# Patient Record
Sex: MALE | Race: WHITE | NOT HISPANIC OR LATINO | Employment: FULL TIME | URBAN - METROPOLITAN AREA
[De-identification: names, ages, dates, MRNs, and addresses within clinical notes are randomized per-mention and may not be internally consistent; named-entity substitution may affect disease eponyms.]

---

## 2017-01-04 RX ORDER — TADALAFIL 5 MG/1
TABLET, FILM COATED ORAL
Qty: 30 TABLET | Refills: 3 | Status: SHIPPED | OUTPATIENT
Start: 2017-01-04 | End: 2017-03-17 | Stop reason: SDUPTHER

## 2017-01-07 ENCOUNTER — LAB VISIT (OUTPATIENT)
Dept: LAB | Facility: HOSPITAL | Age: 42
End: 2017-01-07
Attending: NURSE PRACTITIONER
Payer: COMMERCIAL

## 2017-01-07 DIAGNOSIS — E10.9 TYPE 1 DIABETES MELLITUS WITHOUT COMPLICATION: ICD-10-CM

## 2017-01-07 LAB
ALBUMIN SERPL BCP-MCNC: 4.1 G/DL
ALP SERPL-CCNC: 59 U/L
ALT SERPL W/O P-5'-P-CCNC: 10 U/L
ANION GAP SERPL CALC-SCNC: 7 MMOL/L
AST SERPL-CCNC: 13 U/L
BILIRUB SERPL-MCNC: 1 MG/DL
BUN SERPL-MCNC: 14 MG/DL
CALCIUM SERPL-MCNC: 9.3 MG/DL
CHLORIDE SERPL-SCNC: 102 MMOL/L
CHOLEST/HDLC SERPL: 3.1 {RATIO}
CO2 SERPL-SCNC: 29 MMOL/L
CREAT SERPL-MCNC: 1 MG/DL
EST. GFR  (AFRICAN AMERICAN): >60 ML/MIN/1.73 M^2
EST. GFR  (NON AFRICAN AMERICAN): >60 ML/MIN/1.73 M^2
GLUCOSE SERPL-MCNC: 237 MG/DL
HDL/CHOLESTEROL RATIO: 32.2 %
HDLC SERPL-MCNC: 171 MG/DL
HDLC SERPL-MCNC: 55 MG/DL
LDLC SERPL CALC-MCNC: 102.8 MG/DL
NONHDLC SERPL-MCNC: 116 MG/DL
POTASSIUM SERPL-SCNC: 4.3 MMOL/L
PROT SERPL-MCNC: 7.1 G/DL
SODIUM SERPL-SCNC: 138 MMOL/L
TRIGL SERPL-MCNC: 66 MG/DL

## 2017-01-07 PROCEDURE — 36415 COLL VENOUS BLD VENIPUNCTURE: CPT | Mod: PO

## 2017-01-07 PROCEDURE — 80053 COMPREHEN METABOLIC PANEL: CPT

## 2017-01-07 PROCEDURE — 80061 LIPID PANEL: CPT

## 2017-01-07 PROCEDURE — 83036 HEMOGLOBIN GLYCOSYLATED A1C: CPT

## 2017-01-09 LAB
ESTIMATED AVG GLUCOSE: 146 MG/DL
HBA1C MFR BLD HPLC: 6.7 %

## 2017-01-13 ENCOUNTER — OFFICE VISIT (OUTPATIENT)
Dept: ENDOCRINOLOGY | Facility: CLINIC | Age: 42
End: 2017-01-13
Payer: COMMERCIAL

## 2017-01-13 VITALS
SYSTOLIC BLOOD PRESSURE: 122 MMHG | BODY MASS INDEX: 25.35 KG/M2 | WEIGHT: 187.19 LBS | HEIGHT: 72 IN | HEART RATE: 81 BPM | DIASTOLIC BLOOD PRESSURE: 74 MMHG

## 2017-01-13 DIAGNOSIS — N52.9 ED (ERECTILE DYSFUNCTION) OF ORGANIC ORIGIN: ICD-10-CM

## 2017-01-13 DIAGNOSIS — Z79.4 INSULIN LONG-TERM USE: ICD-10-CM

## 2017-01-13 DIAGNOSIS — E10.9 TYPE 1 DIABETES MELLITUS WITHOUT COMPLICATION: Primary | ICD-10-CM

## 2017-01-13 PROCEDURE — 4010F ACE/ARB THERAPY RXD/TAKEN: CPT | Mod: S$GLB,,, | Performed by: NURSE PRACTITIONER

## 2017-01-13 PROCEDURE — 99999 PR PBB SHADOW E&M-EST. PATIENT-LVL III: CPT | Mod: PBBFAC,,, | Performed by: NURSE PRACTITIONER

## 2017-01-13 PROCEDURE — 99213 OFFICE O/P EST LOW 20 MIN: CPT | Mod: S$GLB,,, | Performed by: NURSE PRACTITIONER

## 2017-01-13 PROCEDURE — 1159F MED LIST DOCD IN RCRD: CPT | Mod: S$GLB,,, | Performed by: NURSE PRACTITIONER

## 2017-01-13 PROCEDURE — 3044F HG A1C LEVEL LT 7.0%: CPT | Mod: S$GLB,,, | Performed by: NURSE PRACTITIONER

## 2017-01-13 PROCEDURE — 2022F DILAT RTA XM EVC RTNOPTHY: CPT | Mod: S$GLB,,, | Performed by: NURSE PRACTITIONER

## 2017-01-13 RX ORDER — ESCITALOPRAM OXALATE 20 MG/1
1 TABLET ORAL
COMMUNITY
Start: 2016-01-29 | End: 2017-01-13

## 2017-01-13 RX ORDER — GABAPENTIN 600 MG/1
TABLET ORAL
COMMUNITY
Start: 2017-01-07 | End: 2017-01-13

## 2017-01-13 RX ORDER — LEVETIRACETAM 750 MG/1
750 TABLET ORAL
COMMUNITY
Start: 2016-02-22 | End: 2019-07-23 | Stop reason: SDUPTHER

## 2017-01-13 RX ORDER — TADALAFIL 5 MG/1
TABLET ORAL
COMMUNITY
Start: 2016-02-02 | End: 2018-02-14 | Stop reason: SDUPTHER

## 2017-01-13 RX ORDER — INSULIN LISPRO 100 [IU]/ML
5 INJECTION, SOLUTION INTRAVENOUS; SUBCUTANEOUS
COMMUNITY
End: 2017-01-13 | Stop reason: SDUPTHER

## 2017-01-13 RX ORDER — LISINOPRIL 5 MG/1
5 TABLET ORAL DAILY
Refills: 11 | COMMUNITY
Start: 2016-11-24 | End: 2017-01-13 | Stop reason: DRUGHIGH

## 2017-01-13 RX ORDER — INSULIN GLARGINE 100 [IU]/ML
25 INJECTION, SOLUTION SUBCUTANEOUS
COMMUNITY
End: 2017-01-13 | Stop reason: SDUPTHER

## 2017-01-13 NOTE — PROGRESS NOTES
Subjective:      Patient ID: Rajesh Donovan is a 41 y.o. male.    Chief Complaint:  Routine DM f/u     History of Present Illness    Pt is a very pleasant,  40 y/o wm with a dx of reportedly confirmed Type 1 DM since  2013. . Pt is now doing carb counting--he is a --and doing well. No logs.   Checks QID. Glucoses near perfect all the time, generally low 100.    No acute events.      Checking glucoses 5 x day  FBS    Lunches--  Supper and bedtime similar but holidays variable.     Diabetes Flow Sheet:  Diabetes Medications:  Lantus 15, Humalog  1:15  Diabetes Complications:--testicular neuropathy --started with lantus/levemir   Aspirin:   Statin: none  ACE/ARB:none   Last Urine Microalbumin:   Last Eye exam:  Last Diabetic Education:            Review of Systems   Constitutional: Negative for activity change, appetite change, fatigue and unexpected weight change.   HENT: Negative for hearing loss and trouble swallowing.    Eyes: Negative for photophobia and visual disturbance.   Respiratory: Negative for cough and shortness of breath.    Cardiovascular: Negative for chest pain, palpitations and leg swelling.   Gastrointestinal: Negative for constipation and diarrhea.   Genitourinary: Negative for difficulty urinating and frequency.             Musculoskeletal: Negative for arthralgias and joint swelling.   Skin: Negative for rash and wound.   Neurological: Positive for numbness (groin region ). Negative for weakness.   Psychiatric/Behavioral: Negative for agitation, decreased concentration and sleep disturbance. The patient is not nervous/anxious.         Family strife/separation continues        Objective:   Physical Exam   Constitutional: He is oriented to person, place, and time. He appears well-developed and well-nourished. No distress.   HENT:   Head: Normocephalic and atraumatic.   Nose: Nose normal.   Mouth/Throat: Oropharynx is clear and moist.   Eyes: Conjunctivae and EOM  are normal. Pupils are equal, round, and reactive to light.   Neck: Normal range of motion. Neck supple. No tracheal deviation present. No thyromegaly present.   Cardiovascular: Normal rate, regular rhythm, normal heart sounds and intact distal pulses.  Exam reveals no friction rub.    No murmur heard.  Pulmonary/Chest: Effort normal and breath sounds normal. No respiratory distress. He has no wheezes.   Musculoskeletal: Normal range of motion. He exhibits no edema.   Feet: no open wounds or calluses.  Good pedal care    Protective Sensation (w/ 10 gram monofilament):  Right: Intact  Left: Intact    Visual Inspection:  Normal -  Bilateral    Pedal Pulses:   Right: Present  Left: Present    Posterior tibialis:   Right:Present  Left: Present     Lymphadenopathy:     He has no cervical adenopathy.   Neurological: He is alert and oriented to person, place, and time. He has normal reflexes. No cranial nerve deficit. He exhibits normal muscle tone. Coordination normal.   Skin: Skin is warm and dry. No rash noted. He is not diaphoretic. No erythema.   Psychiatric: He has a normal mood and affect. His behavior is normal. Judgment and thought content normal.       Lab Review:   Hemoglobin A1C   Date Value Ref Range Status   01/07/2017 6.7 (H) 4.5 - 6.2 % Final     Comment:     According to ADA guidelines, hemoglobin A1C <7.0% represents  optimal control in non-pregnant diabetic patients.  Different  metrics may apply to specific populations.   Standards of Medical Care in Diabetes - 2016.  For the purpose of screening for the presence of diabetes:  <5.7%     Consistent with the absence of diabetes  5.7-6.4%  Consistent with increasing risk for diabetes   (prediabetes)  >or=6.5%  Consistent with diabetes  Currently no consensus exists for use of hemoglobin A1C  for diagnosis of diabetes for children.     04/30/2016 6.5 (H) 4.5 - 6.2 % Final   07/23/2015 7.4 (H) 4.5 - 6.2 % Final       Chemistry        Component Value  Date/Time     01/07/2017 0859    K 4.3 01/07/2017 0859     01/07/2017 0859    CO2 29 01/07/2017 0859    BUN 14 01/07/2017 0859    CREATININE 1.0 01/07/2017 0859     (H) 01/07/2017 0859        Component Value Date/Time    CALCIUM 9.3 01/07/2017 0859    ALKPHOS 59 01/07/2017 0859    AST 13 01/07/2017 0859    ALT 10 01/07/2017 0859    BILITOT 1.0 01/07/2017 0859        Lab Results   Component Value Date    LDLCALC 102.8 01/07/2017     Lab Results   Component Value Date    TSH 1.426 04/12/2014           Assessment:     1. Type 1 diabetes mellitus without complication  -chronic-stable-no changes    2. ED (erectile dysfunction) of organic origin  -good results with cialis    3. Insulin long-term use         Plan:     1. Type 1 DM --continue  lantus to 12 units,  Continue  Humalog  Carb ratio.   1:15  (or 2-5 units with meals      ORDERS  1/13/17  6 mo with m3k--yr to call.

## 2017-02-01 DIAGNOSIS — E10.9 TYPE 1 DIABETES MELLITUS WITHOUT COMPLICATION: ICD-10-CM

## 2017-02-01 RX ORDER — INSULIN LISPRO 100 [IU]/ML
INJECTION, SOLUTION INTRAVENOUS; SUBCUTANEOUS
Qty: 15 SYRINGE | Refills: 3 | Status: SHIPPED | OUTPATIENT
Start: 2017-02-01 | End: 2018-05-04 | Stop reason: SDUPTHER

## 2017-03-20 RX ORDER — TADALAFIL 5 MG/1
TABLET, FILM COATED ORAL
Qty: 30 TABLET | Refills: 3 | Status: SHIPPED | OUTPATIENT
Start: 2017-03-20 | End: 2019-07-23 | Stop reason: ALTCHOICE

## 2017-04-12 ENCOUNTER — PATIENT MESSAGE (OUTPATIENT)
Dept: ENDOCRINOLOGY | Facility: CLINIC | Age: 42
End: 2017-04-12

## 2017-04-13 DIAGNOSIS — E10.9 TYPE 1 DIABETES MELLITUS WITHOUT COMPLICATION: Primary | ICD-10-CM

## 2017-04-17 ENCOUNTER — TELEPHONE (OUTPATIENT)
Dept: ENDOCRINOLOGY | Facility: CLINIC | Age: 42
End: 2017-04-17

## 2017-04-17 NOTE — TELEPHONE ENCOUNTER
Contacted patient to schedule appointments for continuous glucose monitor as per Kelley Mena.  Unable to reach patient but left message on his machine for him to call us back.

## 2017-04-24 ENCOUNTER — CLINICAL SUPPORT (OUTPATIENT)
Dept: DIABETES | Facility: CLINIC | Age: 42
End: 2017-04-24
Payer: COMMERCIAL

## 2017-04-24 DIAGNOSIS — E10.8 TYPE 1 DIABETES MELLITUS WITH COMPLICATION, WITH LONG-TERM CURRENT USE OF INSULIN: Primary | ICD-10-CM

## 2017-04-24 NOTE — PROGRESS NOTES
"DIABETES EDUCATOR NOTE   PLACEMENT OF FREESTYLE TRISHA PRO SENSOR  CONTINOUS GLUCOSE MONITORING SYSTEM (CGMS)    Patient is here in clinic today for placement of continuous glucose monitoring sensor.      Patient verified that they were here for CGMS procedure ordered by their provider and that they have a working glucose meter and supplies at home.   Patient provided with a Freestyle Trisha Sensor and a copy of the Continuous Glucose Monitoring Patient Log to fill out during the study.   A detailed explanation of Continuous Glucose Monitoring was provided. Patient informed that this is a blind procedure and that they will not actually see the blood sugar tracing in real time.  Reviewed with patient the  patient education handout called "Your Freestyle Trisha Pro sensor: What you need to know" to review self-care during the study to avoid sensor loosening or removal ie... bathing, swimming, dressing, and exercising.   Instructed patient to check blood sugar using home glucometer and to record the following on provided patient log sheets: Blood sugar taken at home, Meals and snacks, Activity, and Diabetes medications taken and dosage    Patient was brought to a private location.  Arm for insertion was selected and prepared and allowed to dry. Glucose Sensor Serial Number [id number] was inserted to back of patient's right upper arm.    The following forms were given and reviewed in detail with patient and all questions answered.   · Continuous Glucose Monitoring Patient Log #[id number]  · Freestyle Manufacture Patient Handout "Your Freestyle Trisha Pro Sensor: What you need to know"     Instructions: Time: 15 min   Insertion of sensor done individually in private:  Time: 5 minutes         "

## 2017-05-01 ENCOUNTER — CLINICAL SUPPORT (OUTPATIENT)
Dept: DIABETES | Facility: CLINIC | Age: 42
End: 2017-05-01
Payer: COMMERCIAL

## 2017-05-01 DIAGNOSIS — E10.9 TYPE 1 DIABETES MELLITUS WITHOUT COMPLICATION: ICD-10-CM

## 2017-05-01 PROCEDURE — 95250 CONT GLUC MNTR PHYS/QHP EQP: CPT | Mod: S$GLB,,, | Performed by: DIETITIAN, REGISTERED

## 2017-05-01 NOTE — PROGRESS NOTES
DIABETES EDUCATOR NOTE   Return of the Freestyle Nguyen Pro Sensor and Patient Log.    Patient returned to clinic today to return Glucose Sensor and signed patient log form used in CGMS procedure.    The CGMS Sensor will be scanned and downloaded. All reports will be imported into the patient's electronic medical record.    Endocrine provider will complete data interpretation on 5/9 and make recommendations; will forward recommendations to the ordering provider for follow up with patient.

## 2017-05-09 ENCOUNTER — OFFICE VISIT (OUTPATIENT)
Dept: ENDOCRINOLOGY | Facility: CLINIC | Age: 42
End: 2017-05-09
Payer: COMMERCIAL

## 2017-05-09 VITALS
HEART RATE: 82 BPM | WEIGHT: 178 LBS | BODY MASS INDEX: 24.11 KG/M2 | SYSTOLIC BLOOD PRESSURE: 108 MMHG | DIASTOLIC BLOOD PRESSURE: 72 MMHG | HEIGHT: 72 IN

## 2017-05-09 DIAGNOSIS — E10.9 TYPE 1 DIABETES MELLITUS WITHOUT COMPLICATION: Primary | ICD-10-CM

## 2017-05-09 DIAGNOSIS — N52.9 ED (ERECTILE DYSFUNCTION) OF ORGANIC ORIGIN: ICD-10-CM

## 2017-05-09 DIAGNOSIS — Z79.4 INSULIN LONG-TERM USE: ICD-10-CM

## 2017-05-09 PROCEDURE — 99999 PR PBB SHADOW E&M-EST. PATIENT-LVL III: CPT | Mod: PBBFAC,,, | Performed by: NURSE PRACTITIONER

## 2017-05-09 PROCEDURE — 4010F ACE/ARB THERAPY RXD/TAKEN: CPT | Mod: S$GLB,,, | Performed by: NURSE PRACTITIONER

## 2017-05-09 PROCEDURE — 1160F RVW MEDS BY RX/DR IN RCRD: CPT | Mod: S$GLB,,, | Performed by: NURSE PRACTITIONER

## 2017-05-09 PROCEDURE — 99215 OFFICE O/P EST HI 40 MIN: CPT | Mod: S$GLB,,, | Performed by: NURSE PRACTITIONER

## 2017-05-09 PROCEDURE — 3044F HG A1C LEVEL LT 7.0%: CPT | Mod: S$GLB,,, | Performed by: NURSE PRACTITIONER

## 2017-05-09 NOTE — PROGRESS NOTES
Subjective:      Patient ID: Rajesh Donovan is a 41 y.o. male.    Chief Complaint:  Routine DM f/u     History of Present Illness    Pt is a very pleasant,  40 y/o wm with a dx of reportedly confirmed Type 1 DM since  2013. . Pt is now doing carb counting--he is a --Several months ago--pt noted glucose control had deteriated, so we opted to do an Ipro to identify any patterns. Checks QID. No acute events.  Overall mood is poor regarding marital status and chronic disease.     I pro--Key findings.   Nocturnal hypoglycemia  (50's) 2 nights--after soccer games.   Marked prolonged prandial hyperglycemia noted with meals, though meals are actually not unreasonable.    See Media for complete report.                Checking glucoses 5 x day  FBS    Lunches--  Supper and bedtime similar but holidays variable.     Diabetes Flow Sheet:  Diabetes Medications:  Lantus 15, Humalog  1:15  Diabetes Complications:--testicular neuropathy --started with lantus/levemir   Aspirin:   Statin: none  ACE/ARB:none   Last Urine Microalbumin:   Last Eye exam:  Last Diabetic Education:            Review of Systems   Constitutional: Negative for activity change, appetite change, fatigue and unexpected weight change.   HENT: Negative for hearing loss and trouble swallowing.    Eyes: Negative for photophobia and visual disturbance.   Respiratory: Negative for cough and shortness of breath.    Cardiovascular: Negative for chest pain, palpitations and leg swelling.   Gastrointestinal: Negative for constipation and diarrhea.   Genitourinary: Negative for difficulty urinating and frequency.        ED with numbness in groin      Musculoskeletal: Negative for arthralgias and joint swelling.   Skin: Negative for rash and wound.   Neurological: Positive for numbness (groin region ). Negative for weakness.   Psychiatric/Behavioral: Negative for agitation, decreased concentration and sleep disturbance. The patient is not  nervous/anxious.         Family strife/separation continues        Objective:   Physical Exam   Constitutional: He is oriented to person, place, and time. He appears well-developed and well-nourished. No distress.   HENT:   Head: Normocephalic and atraumatic.   Nose: Nose normal.   Mouth/Throat: Oropharynx is clear and moist.   Eyes: Conjunctivae and EOM are normal. Pupils are equal, round, and reactive to light.   Neck: Normal range of motion. Neck supple. No tracheal deviation present. No thyromegaly present.   Cardiovascular: Normal rate, regular rhythm, normal heart sounds and intact distal pulses.  Exam reveals no friction rub.    No murmur heard.  Pulmonary/Chest: Effort normal and breath sounds normal. No respiratory distress. He has no wheezes.   Musculoskeletal: Normal range of motion. He exhibits no edema.   Feet: no open wounds or calluses.  Good pedal care       Lymphadenopathy:     He has no cervical adenopathy.   Neurological: He is alert and oriented to person, place, and time. He has normal reflexes. No cranial nerve deficit. He exhibits normal muscle tone. Coordination normal.   Skin: Skin is warm and dry. No rash noted. He is not diaphoretic. No erythema.   Psychiatric: He has a normal mood and affect. His behavior is normal. Judgment and thought content normal.       Lab Review:   Hemoglobin A1C   Date Value Ref Range Status   01/07/2017 6.7 (H) 4.5 - 6.2 % Final     Comment:     According to ADA guidelines, hemoglobin A1C <7.0% represents  optimal control in non-pregnant diabetic patients.  Different  metrics may apply to specific populations.   Standards of Medical Care in Diabetes - 2016.  For the purpose of screening for the presence of diabetes:  <5.7%     Consistent with the absence of diabetes  5.7-6.4%  Consistent with increasing risk for diabetes   (prediabetes)  >or=6.5%  Consistent with diabetes  Currently no consensus exists for use of hemoglobin A1C  for diagnosis of diabetes for  children.     04/30/2016 6.5 (H) 4.5 - 6.2 % Final   07/23/2015 7.4 (H) 4.5 - 6.2 % Final       Chemistry        Component Value Date/Time     01/07/2017 0859    K 4.3 01/07/2017 0859     01/07/2017 0859    CO2 29 01/07/2017 0859    BUN 14 01/07/2017 0859    CREATININE 1.0 01/07/2017 0859     (H) 01/07/2017 0859        Component Value Date/Time    CALCIUM 9.3 01/07/2017 0859    ALKPHOS 59 01/07/2017 0859    AST 13 01/07/2017 0859    ALT 10 01/07/2017 0859    BILITOT 1.0 01/07/2017 0859        Lab Results   Component Value Date    LDLCALC 102.8 01/07/2017     Lab Results   Component Value Date    TSH 1.426 04/12/2014       Assessment:     1. Type 1 diabetes mellitus without complication  -chronic-uncontrolled-see plan --ipro reviewed-complex.    2. ED (erectile dysfunction) of organic origin  -chronic-uncontrolled-cons urology    3. Insulin long-term use         Plan:     1. Type 1 DM --continue  lantus to 12 units, --Advised to stiffen up carb ration from 1:15 to 1:12 or 1:13  Humalog  1:12--1:13   (or 2-5 units with meals)  Pt would benefit from from.  DIscussion of same.      ORDERS  5/9/17    6 mo with a1c-- prior  ---pt will call for this  Cons Urology --mail this

## 2017-05-11 ENCOUNTER — PATIENT MESSAGE (OUTPATIENT)
Dept: ENDOCRINOLOGY | Facility: CLINIC | Age: 42
End: 2017-05-11

## 2017-05-19 DIAGNOSIS — E10.9 TYPE 1 DIABETES MELLITUS WITHOUT COMPLICATION: Primary | ICD-10-CM

## 2017-05-23 DIAGNOSIS — E10.9 TYPE 1 DIABETES MELLITUS WITHOUT COMPLICATION: ICD-10-CM

## 2017-05-23 RX ORDER — LANCETS 33 GAUGE
1 EACH MISCELLANEOUS 4 TIMES DAILY
Qty: 150 EACH | Refills: 12 | Status: SHIPPED | OUTPATIENT
Start: 2017-05-23

## 2017-05-23 RX ORDER — PEN NEEDLE, DIABETIC 30 GX3/16"
NEEDLE, DISPOSABLE MISCELLANEOUS
Qty: 150 EACH | Refills: 12 | Status: SHIPPED | OUTPATIENT
Start: 2017-05-23 | End: 2018-05-04 | Stop reason: SDUPTHER

## 2017-05-25 DIAGNOSIS — E10.9 TYPE 1 DIABETES MELLITUS WITHOUT COMPLICATION: ICD-10-CM

## 2017-05-26 ENCOUNTER — LAB VISIT (OUTPATIENT)
Dept: LAB | Facility: HOSPITAL | Age: 42
End: 2017-05-26
Attending: UROLOGY
Payer: COMMERCIAL

## 2017-05-26 ENCOUNTER — OFFICE VISIT (OUTPATIENT)
Dept: UROLOGY | Facility: CLINIC | Age: 42
End: 2017-05-26
Payer: COMMERCIAL

## 2017-05-26 VITALS
HEART RATE: 87 BPM | HEIGHT: 72 IN | WEIGHT: 174.19 LBS | SYSTOLIC BLOOD PRESSURE: 102 MMHG | BODY MASS INDEX: 23.59 KG/M2 | DIASTOLIC BLOOD PRESSURE: 65 MMHG

## 2017-05-26 DIAGNOSIS — N52.9 IMPOTENCE: ICD-10-CM

## 2017-05-26 DIAGNOSIS — R79.89 LOW TESTOSTERONE: ICD-10-CM

## 2017-05-26 DIAGNOSIS — N52.9 IMPOTENCE: Primary | ICD-10-CM

## 2017-05-26 LAB — TESTOST SERPL-MCNC: 1196 NG/DL

## 2017-05-26 PROCEDURE — 99999 PR PBB SHADOW E&M-EST. PATIENT-LVL III: CPT | Mod: PBBFAC,,, | Performed by: UROLOGY

## 2017-05-26 PROCEDURE — 99204 OFFICE O/P NEW MOD 45 MIN: CPT | Mod: S$GLB,,, | Performed by: UROLOGY

## 2017-05-26 PROCEDURE — 84402 ASSAY OF FREE TESTOSTERONE: CPT

## 2017-05-26 PROCEDURE — 84403 ASSAY OF TOTAL TESTOSTERONE: CPT

## 2017-05-26 RX ORDER — SILDENAFIL CITRATE 20 MG/1
TABLET ORAL
Qty: 30 TABLET | Refills: 11 | Status: SHIPPED | OUTPATIENT
Start: 2017-05-26 | End: 2018-10-10 | Stop reason: SDUPTHER

## 2017-05-26 NOTE — LETTER
May 26, 2017      VENESSA Bullard,ANP-C  [address] [facility] [address]  [facility] [address]           [place] [facility]  [address] [facility] [address]  [facility] [address]  Phone: [phone]          Patient: Rajesh Donovan   MR Number: [id number]   YOB: 1975   Date of Visit: 5/26/2017       Dear Kelley Mena:    Thank you for referring Rajesh Donovan to me for evaluation. Attached you will find relevant portions of my assessment and plan of care.    If you have questions, please do not hesitate to call me. I look forward to following Rajesh Donovan along with you.    Sincerely,    OLU Contreras MD    Enclosure  CC:  No Recipients    If you would like to receive this communication electronically, please contact [email or web address] or [phone] to request more information on [employer] access.    For providers and/or their staff who would like to refer a patient to [facility], please contact us through our one-stop-shop provider referral line, Ghulam Moya, at [phone].    If you feel you have received this communication in error or would no longer like to receive these types of communications, please e-mail [email or web address]

## 2017-05-26 NOTE — PROGRESS NOTES
Subjective:       Patient ID: Rajesh Donovan is a 41 y.o. male.    Chief Complaint: Erectile Dysfunction    HPI     41 year old was diagnosed with Diabetes approximately 5 years ago.  He now complains of worsening ED.  He has tried Cialis in the past.  He usually takes 5 or 10 mg prn.  Initially this was effective but is less effective and the cost is too much.  He exercises regularly and has a healthy diet.   He has symptoms of low testosterone.  KM 8/10 positive.  He has no bothersome voiding symptoms.  He denies hematuria and dysuria.  He has no family history of prostate cancer.  He has one child age 10 and he wants no more children.        Past Medical History:   Diagnosis Date    Type I (juvenile type) diabetes mellitus without mention of complication, uncontrolled 2012     History reviewed. No pertinent surgical history.      Current Outpatient Prescriptions:     blood sugar diagnostic (ONETOUCH ULTRA TEST) Strp, 1 strip by Misc.(Non-Drug; Combo Route) route 5 (five) times daily., Disp: 150 strip, Rfl: 12    CIALIS 5 mg tablet, TAKE 1 TABLET (5 MG TOTAL) BY MOUTH DAILY AS NEEDED FOR ERECTILE DYSFUNCTION., Disp: 30 tablet, Rfl: 3    gabapentin (NEURONTIN) 600 MG tablet, Take 900 mg by mouth 2 (two) times daily., Disp: , Rfl:     insulin glargine (LANTUS SOLOSTAR) 100 unit/mL (3 mL) InPn pen, Inject 15 Units into the skin every evening., Disp: 15 Syringe, Rfl: 12    insulin lispro (HUMALOG KWIKPEN) 100 unit/mL InPn pen, INJECT 2 TO 5 UNITS INTO THE SKIN 3 TIMES A DAY BEFORE MEALS WITH SLIDING SCALE., Disp: 15 Syringe, Rfl: 3    lancets (ONETOUCH DELICA LANCETS) 33 gauge Misc, 1 lancet by Other route 4 (four) times daily. Pt to use to check glucoses  qid, Disp: 150 each, Rfl: 12    levetiracetam (KEPPRA) 750 MG Tab, Take 750 mg by mouth., Disp: , Rfl:     lisinopril-hydrochlorothiazide (PRINZIDE,ZESTORETIC) 20-12.5 mg per tablet, Take 1 tablet by mouth once daily., Disp: 90 tablet, Rfl: 3     "ONE TOUCH ULTRA 2 kit, , Disp: , Rfl: 0    pen needle, diabetic (BD ULTRA-FINE ÁNGEL PEN NEEDLES) 32 gauge x 5/32" [place], Pt to check glucose qid, Disp: 150 each, Rfl: 12    tadalafil (CIALIS) 5 MG tablet, , Disp: , Rfl:     sildenafil (REVATIO) 20 mg Tab, 3 tables by mouth daily prn, Disp: 30 tablet, Rfl: 11    Review of Systems   Constitutional: Negative for fever.   Eyes: Negative for visual disturbance.   Respiratory: Negative for shortness of breath.    Cardiovascular: Negative for chest pain.   Gastrointestinal: Negative for nausea and vomiting.   Genitourinary: Negative for dysuria and hematuria.   Musculoskeletal: Negative for gait problem.   Skin: Negative for rash.   Neurological: Negative for seizures.   Psychiatric/Behavioral: Negative for confusion.       Objective:      Physical Exam   Constitutional: He is oriented to person, place, and time. He appears well-developed and well-nourished.   HENT:   Head: Normocephalic and atraumatic.   Eyes: Conjunctivae are normal.   Cardiovascular: Normal rate.    Pulmonary/Chest: Effort normal.   Abdominal: Hernia confirmed negative in the right inguinal area and confirmed negative in the left inguinal area.   Genitourinary: Testes normal and penis normal. Rectal exam shows no mass and anal tone normal. Prostate is enlarged (30g s/s/a). Prostate is not tender. Cremasteric reflex is present.   Musculoskeletal: Normal range of motion. He exhibits no edema.   Lymphadenopathy: No inguinal adenopathy noted on the right or left side.   Neurological: He is alert and oriented to person, place, and time.   Skin: Skin is warm and dry. No rash noted.   Psychiatric: He has a normal mood and affect.   Vitals reviewed.      Assessment:       1. Impotence    2. Low testosterone        Plan:       Impotence  -     Testosterone; Future; Expected date: 05/26/2017  -     Testosterone, free; Future; Expected date: 05/26/2017    Low testosterone    Other orders  -     sildenafil " (REVATIO) 20 mg Tab; 3 tables by mouth daily prn  Dispense: 30 tablet; Refill: 11      Rec trial Sidenafil.  Titrate to 100mg.  Baseline testosterone.  We discussed a trial of testosterone replacement if low.

## 2017-05-29 ENCOUNTER — CLINICAL SUPPORT (OUTPATIENT)
Dept: DIABETES | Facility: CLINIC | Age: 42
End: 2017-05-29
Payer: COMMERCIAL

## 2017-05-29 VITALS — BODY MASS INDEX: 23.83 KG/M2 | WEIGHT: 175.94 LBS | HEIGHT: 72 IN

## 2017-05-29 DIAGNOSIS — Z46.81 COUNSELING FOR INSULIN PUMP: ICD-10-CM

## 2017-05-29 DIAGNOSIS — E10.9 TYPE 1 DIABETES MELLITUS WITHOUT COMPLICATION: Primary | ICD-10-CM

## 2017-05-29 PROCEDURE — 99999 PR PBB SHADOW E&M-EST. PATIENT-LVL I: CPT | Mod: PBBFAC,,,

## 2017-05-29 PROCEDURE — G0108 DIAB MANAGE TRN  PER INDIV: HCPCS | Mod: S$GLB,,, | Performed by: DIETITIAN, REGISTERED

## 2017-05-29 NOTE — PROGRESS NOTES
Patient of Kelley Mena NP here for insulin pump evaluation.  Pt has had Type 1 diabetes since 2013, diagnosed by Dr. Martinez (PCP).  Reviewed the current insulin pumps on the market with patient including Fortuna, Medtronic, Tandem, and Omnipod. Patient allowed to see demo models and demonstrated basics of each. Patient was not aware an insulin pump was a 24 hr device, thought he could put on and take off insulin pump throughout the day.  Reviewed the benefits of pump therapy and patient states he simply wants to manage his DM better and is willing to do whatever that takes.    After reviewing the insulin pump models, patient feels the Medtronic MiniMed 670G would best suit his active lifestyle. Will gather paperwork necessary to submit to [employer] for insulin pump approval and fax to [employer] rep Marquez.  Pt does not have a C-peptide on file with [facility] and would prefer not to get stuck for lab work, contacted his PCP, Dr Martinez to obtain date.  Left msg this morning for Dr. Martinez's nurse, Tali to fax C-peptide and fasting glucose if available.  If labs not available, pt verbalizes understanding additional lab work may be necessary.      All questions answered in regard to insulin pump models.  Patient aware that he will return to Endocrinology clinic to start insulin pump therapy.  Patient given contact number if additional questions or concerns arise.

## 2017-05-30 ENCOUNTER — TELEPHONE (OUTPATIENT)
Dept: UROLOGY | Facility: CLINIC | Age: 42
End: 2017-05-30

## 2017-05-30 NOTE — TELEPHONE ENCOUNTER
----- Message from OLU Contreras MD sent at 5/30/2017  8:05 AM CDT -----  Call with testosterone level.  Actually high on no therapy.  I do not recommend testosterone replacement.

## 2017-07-10 ENCOUNTER — TELEPHONE (OUTPATIENT)
Dept: ENDOCRINOLOGY | Facility: CLINIC | Age: 42
End: 2017-07-10

## 2017-07-10 NOTE — TELEPHONE ENCOUNTER
Kelley,    Pt will be starting on Medtronic 670G (no sensor, just manual mode) on Wednesday 7/12/17.  Please write insulin pump orders.  Currently on Lantus 15 units QHS and Humalog at meals.      Also, please send Rx for Humalog vials to [facility] in [place] (store #[id number]).      Thanks,  Theodora

## 2017-07-12 ENCOUNTER — TELEPHONE (OUTPATIENT)
Dept: ENDOCRINOLOGY | Facility: CLINIC | Age: 42
End: 2017-07-12

## 2017-07-12 ENCOUNTER — CLINICAL SUPPORT (OUTPATIENT)
Dept: DIABETES | Facility: CLINIC | Age: 42
End: 2017-07-12
Payer: COMMERCIAL

## 2017-07-12 VITALS — WEIGHT: 174.19 LBS | BODY MASS INDEX: 23.59 KG/M2 | HEIGHT: 72 IN

## 2017-07-12 DIAGNOSIS — E10.9 TYPE 1 DIABETES MELLITUS WITHOUT COMPLICATION: Primary | ICD-10-CM

## 2017-07-12 DIAGNOSIS — Z46.81 INSULIN PUMP TRAINING: ICD-10-CM

## 2017-07-12 PROCEDURE — 99999 PR PBB SHADOW E&M-EST. PATIENT-LVL I: CPT | Mod: PBBFAC,,,

## 2017-07-12 PROCEDURE — G0108 DIAB MANAGE TRN  PER INDIV: HCPCS | Mod: S$GLB,,, | Performed by: DIETITIAN, REGISTERED

## 2017-07-12 RX ORDER — INSULIN LISPRO 100 [IU]/ML
INJECTION, SOLUTION INTRAVENOUS; SUBCUTANEOUS
Qty: 1 VIAL | Refills: 12 | Status: SHIPPED | OUTPATIENT
Start: 2017-07-12 | End: 2017-07-12 | Stop reason: SDUPTHER

## 2017-07-12 RX ORDER — INSULIN ASPART 100 [IU]/ML
INJECTION, SOLUTION INTRAVENOUS; SUBCUTANEOUS
Qty: 1 VIAL | Refills: 12 | Status: SHIPPED | OUTPATIENT
Start: 2017-07-12 | End: 2017-07-12 | Stop reason: CLARIF

## 2017-07-12 RX ORDER — INSULIN LISPRO 100 [IU]/ML
INJECTION, SOLUTION INTRAVENOUS; SUBCUTANEOUS
Qty: 1 VIAL | Refills: 12 | COMMUNITY
Start: 2017-07-12 | End: 2017-07-13 | Stop reason: SDUPTHER

## 2017-07-12 RX ORDER — INSULIN ASPART 100 [IU]/ML
INJECTION, SOLUTION INTRAVENOUS; SUBCUTANEOUS
Qty: 1 VIAL | Refills: 12 | Status: SHIPPED | OUTPATIENT
Start: 2017-07-12 | End: 2017-07-12 | Stop reason: SDUPTHER

## 2017-07-12 RX ORDER — INSULIN ASPART 100 [IU]/ML
INJECTION, SOLUTION INTRAVENOUS; SUBCUTANEOUS
Qty: 1 VIAL | Refills: 0 | COMMUNITY
Start: 2017-07-12 | End: 2017-07-12 | Stop reason: CLARIF

## 2017-07-12 NOTE — PROGRESS NOTES
DIABETES EDUCATOR NOTE -  MINIMED 670G MANUAL MODE    REFERRING PROVIDER: Kelley Mena NP      Patient here today for training on the Minimed 670G insulin pump. Pump training was provided per Medtronic/Minimed protocol.     Details of pump therapy were covered with the patient. Reviewed basal versus bolus insulin delivery.      Basic insulin pump features by menu were reviewed in detail. Features included:  Suspend delivery/Resume  History  Reminders  Utilities    Instructed and assisted in programming pump following Insulin Settings menu.  Settings were programmed as follows per provider:    Blood sugar prior to starting insulin pump: 165 mg/dL.  Pt reports he ran out of Lantus on Sunday and has only taken Humalog at meals.        Initial Settings  Basal rate:0.4 u/hr  Preset Temp Rate: n/a  Preset Bolus: off  Dual/Square : off  Max Basal rate 2 u/hr  Max Bolus: 15 u  Auto Suspend: off  Bolus Speed: Standard    Programmed Bolus Wizard settings per provider:    Bolus Wizard  CHO RATIO: 1:15  Insulin Sensitivity :50  Blood glucose goal:100 mg/dL  Active insulin:4 hours    Instructed and reviewed [place] II & Tubing menu:  Patient will be using Quickset infusion set with 6mm cannula and tubing length of 23 inches.  Patient successfully demonstrated the ability to fill reservoir with insulin, rewind pump, and place reservoir into pump   Patient inserted the infusion set with aseptic technique and secured it to right side of abdomen. Reviewed site selection, rotation.         Contour Next Link Meter  Glucometer successfully auto-connected to insulin pump for roly with Bolus Wizard.  Reviewed frequency of monitoring glucose as well as bolus feature available from meter.     Discussed storage of insulin and back-up plan if pump is broken or fails ;  Encouraged patient to keep extra insulin as needed. Reviewed treatment of hypoglycemia, hyperglycemia and troubleshooting of pump referring to Quick - Reference Safety rules  .    [employer] 24 hour support line provided and personal cell #.    Follow-up Plan:   Patient will set up Medtronic Care-link pump download program. Pt does not have a computer at home.   Plan is follow up on 7/26/17 for a pump download in clinic.  Pt given cell numbers of diabetes educators and will call him in next 1-2 days for update  Patient has Medtronic Insulin Pump training packet from [employer]   Patient verbalized understanding of all instructions given.    Counseling time: 90 minutes

## 2017-07-12 NOTE — TELEPHONE ENCOUNTER
Pt coming in for insulin pump start---Minimed 670 G.   On 10 lantus, and amy with CHO of 1:12 or 1:13    Pump start orders    Basal rate  12 mid----0.4 u/hr ATC  CHO 1:15  ISF 1:15    Goal Negotiable.   Return in 2 weeks for download and review with me or DE.

## 2017-07-13 ENCOUNTER — TELEPHONE (OUTPATIENT)
Dept: ENDOCRINOLOGY | Facility: CLINIC | Age: 42
End: 2017-07-13

## 2017-07-13 LAB — TESTOST FREE SERPL-MCNC: 10.7 PG/ML

## 2017-07-13 RX ORDER — INSULIN LISPRO 100 [IU]/ML
INJECTION, SOLUTION INTRAVENOUS; SUBCUTANEOUS
Qty: 1 VIAL | Refills: 0 | COMMUNITY
Start: 2017-07-13 | End: 2018-05-04

## 2017-07-13 RX ORDER — INSULIN LISPRO 100 [IU]/ML
INJECTION, SOLUTION INTRAVENOUS; SUBCUTANEOUS
Qty: 1 VIAL | Refills: 0 | Status: CANCELLED | COMMUNITY
Start: 2017-07-13

## 2017-07-13 NOTE — TELEPHONE ENCOUNTER
Pt called stating his glucose > 300 mg/dL.  Pt was started on Medtronic 670G insulin pump yesterday.  Pt was instructed to change his infusion set.  He was also asked to give a manual injection of Humalog--pt took 6 units manual injection at 1:30pm (glucose 350).  Pt asked to come into clinic for additional insulin pump training.  Pt arrived at 2pm, glucose checked to be 263 mg/dL, rechecked at 2:45PM to be 222 mg/dl prior to leaving clinic.    While at clinic, changed infusion set with patient, infusion set taken out was bent so likely pt not receiving insulin and glucose rising.  New infusion set placed and verified pt technique with Medtronic representative. Also reviewed use of Bolus Wizard with patient again to verfiy pt is using pump correctly.  Pt given personal cell # to call if glucose levels remain uncontrolled.  Will call pt to check on progress with insulin pump tonight.

## 2017-07-26 ENCOUNTER — OFFICE VISIT (OUTPATIENT)
Dept: ENDOCRINOLOGY | Facility: CLINIC | Age: 42
End: 2017-07-26
Payer: COMMERCIAL

## 2017-07-26 VITALS
WEIGHT: 179.81 LBS | HEART RATE: 68 BPM | SYSTOLIC BLOOD PRESSURE: 112 MMHG | DIASTOLIC BLOOD PRESSURE: 76 MMHG | BODY MASS INDEX: 24.35 KG/M2 | HEIGHT: 72 IN

## 2017-07-26 DIAGNOSIS — E10.9 TYPE 1 DIABETES MELLITUS WITHOUT COMPLICATION: Primary | ICD-10-CM

## 2017-07-26 DIAGNOSIS — Z96.41 INSULIN PUMP STATUS: ICD-10-CM

## 2017-07-26 DIAGNOSIS — Z46.81 INSULIN PUMP FITTING OR ADJUSTMENT: ICD-10-CM

## 2017-07-26 PROCEDURE — 3044F HG A1C LEVEL LT 7.0%: CPT | Mod: S$GLB,,, | Performed by: NURSE PRACTITIONER

## 2017-07-26 PROCEDURE — 4010F ACE/ARB THERAPY RXD/TAKEN: CPT | Mod: S$GLB,,, | Performed by: NURSE PRACTITIONER

## 2017-07-26 PROCEDURE — 99999 PR PBB SHADOW E&M-EST. PATIENT-LVL II: CPT | Mod: PBBFAC,,, | Performed by: NURSE PRACTITIONER

## 2017-07-26 PROCEDURE — 99214 OFFICE O/P EST MOD 30 MIN: CPT | Mod: S$GLB,,, | Performed by: NURSE PRACTITIONER

## 2017-07-26 NOTE — PROGRESS NOTES
Subjective:      Patient ID: Rajesh Donovan is a 41 y.o. male.    Chief Complaint:  Routine DM f/u     History of Present Illness    Pt is a very pleasant,  40 y/o wm with a dx of reportedly confirmed Type 1 DM since  2013. . Pt is now doing carb counting--he is a --Several months ago--pt noted glucose control had deteriated, so we opted to do an Ipro to identify any patterns. Checks QID. No acute events.  Overall mood is poor regarding marital status and chronic disease.     Interim Events: Started Medtronic 670 G.  Couple failed starts r/t cannula kinking with thin body habitus.    Also period of marked hyperglcyemia r/t poor priming and air in tubing.    Pump downloaded.    2-3 hr pp readings are still above goa. .    FBS often close to goal.      Changes site q 3days.   Checks glucoses 8 x a day         Medtronic 670 G  12 mid---0.4 u/hr atc  Cho 1:14  ISF 1:50.   Goal of 100.       Diabetes Flow Sheet:  Diabetes Medications:  Lantus 15, Humalog  1:15  Diabetes Complications:--testicular neuropathy --started with lantus/levemir   Aspirin:   Statin: none  ACE/ARB:none   Last Urine Microalbumin:   Last Eye exam:  Last Diabetic Education:            Review of Systems    Objective:   Physical Exam    Lab Review:   Hemoglobin A1C   Date Value Ref Range Status   01/07/2017 6.7 (H) 4.5 - 6.2 % Final     Comment:     According to ADA guidelines, hemoglobin A1C <7.0% represents  optimal control in non-pregnant diabetic patients.  Different  metrics may apply to specific populations.   Standards of Medical Care in Diabetes - 2016.  For the purpose of screening for the presence of diabetes:  <5.7%     Consistent with the absence of diabetes  5.7-6.4%  Consistent with increasing risk for diabetes   (prediabetes)  >or=6.5%  Consistent with diabetes  Currently no consensus exists for use of hemoglobin A1C  for diagnosis of diabetes for children.     04/30/2016 6.5 (H) 4.5 - 6.2 % Final   07/23/2015 7.4  (H) 4.5 - 6.2 % Final       Chemistry        Component Value Date/Time     01/07/2017 0859    K 4.3 01/07/2017 0859     01/07/2017 0859    CO2 29 01/07/2017 0859    BUN 14 01/07/2017 0859    CREATININE 1.0 01/07/2017 0859     (H) 01/07/2017 0859        Component Value Date/Time    CALCIUM 9.3 01/07/2017 0859    ALKPHOS 59 01/07/2017 0859    AST 13 01/07/2017 0859    ALT 10 01/07/2017 0859    BILITOT 1.0 01/07/2017 0859        Lab Results   Component Value Date    LDLCALC 102.8 01/07/2017     Lab Results   Component Value Date    TSH 1.426 04/12/2014       Assessment:     1. Type 1 diabetes mellitus without complication  -chronic-uncontrolled-see plan    2. Insulin pump status     3. Insulin pump fitting or adjustment         Plan:     Stiffen up carb ration from 1:14 to 1:12.       ORDERS  7/26/17  3 mo with a1c on Saturday prior--appt with me at 3:30     30 min >50% counseling on pump expectations, changing carb ratios, learning curve, reassured.

## 2017-09-29 RX ORDER — LISINOPRIL AND HYDROCHLOROTHIAZIDE 12.5; 2 MG/1; MG/1
1 TABLET ORAL DAILY
Qty: 90 TABLET | Refills: 3 | Status: SHIPPED | OUTPATIENT
Start: 2017-09-29 | End: 2018-09-10 | Stop reason: SDUPTHER

## 2017-12-27 ENCOUNTER — CLINICAL SUPPORT (OUTPATIENT)
Dept: DIABETES | Facility: CLINIC | Age: 42
End: 2017-12-27
Payer: COMMERCIAL

## 2017-12-27 ENCOUNTER — LAB VISIT (OUTPATIENT)
Dept: LAB | Facility: HOSPITAL | Age: 42
End: 2017-12-27
Attending: NURSE PRACTITIONER
Payer: COMMERCIAL

## 2017-12-27 VITALS — BODY MASS INDEX: 25.41 KG/M2 | WEIGHT: 187.38 LBS

## 2017-12-27 DIAGNOSIS — Z46.81 INSULIN PUMP FITTING OR ADJUSTMENT: ICD-10-CM

## 2017-12-27 DIAGNOSIS — E10.9 TYPE 1 DIABETES MELLITUS WITHOUT COMPLICATION: Primary | ICD-10-CM

## 2017-12-27 DIAGNOSIS — E10.9 TYPE 1 DIABETES MELLITUS WITHOUT COMPLICATION: ICD-10-CM

## 2017-12-27 LAB
ALBUMIN SERPL BCP-MCNC: 4.2 G/DL
ALP SERPL-CCNC: 67 U/L
ALT SERPL W/O P-5'-P-CCNC: 22 U/L
ANION GAP SERPL CALC-SCNC: 7 MMOL/L
AST SERPL-CCNC: 26 U/L
BILIRUB SERPL-MCNC: 1 MG/DL
BUN SERPL-MCNC: 18 MG/DL
CALCIUM SERPL-MCNC: 10.1 MG/DL
CHLORIDE SERPL-SCNC: 100 MMOL/L
CO2 SERPL-SCNC: 30 MMOL/L
CREAT SERPL-MCNC: 1 MG/DL
EST. GFR  (AFRICAN AMERICAN): >60 ML/MIN/1.73 M^2
EST. GFR  (NON AFRICAN AMERICAN): >60 ML/MIN/1.73 M^2
ESTIMATED AVG GLUCOSE: 140 MG/DL
GLUCOSE SERPL-MCNC: 174 MG/DL
HBA1C MFR BLD HPLC: 6.5 %
POTASSIUM SERPL-SCNC: 4.5 MMOL/L
PROT SERPL-MCNC: 7.9 G/DL
SODIUM SERPL-SCNC: 137 MMOL/L

## 2017-12-27 PROCEDURE — 83036 HEMOGLOBIN GLYCOSYLATED A1C: CPT

## 2017-12-27 PROCEDURE — 36415 COLL VENOUS BLD VENIPUNCTURE: CPT | Mod: PO

## 2017-12-27 PROCEDURE — 80053 COMPREHEN METABOLIC PANEL: CPT

## 2017-12-27 PROCEDURE — G0108 DIAB MANAGE TRN  PER INDIV: HCPCS | Mod: S$GLB,,, | Performed by: DIETITIAN, REGISTERED

## 2017-12-27 NOTE — PROGRESS NOTES
"Patient here today to initiate Guardian Sensor with MiniMed 670G insulin pump.   Will initiate Automode on 670G pump at next Endo provider visit. Pt overdue for labs (Hgb A1c, CMP) and sent for nonfasting labs after appt today. Patient due for provider appt--would like to wait until new year due to financial concerns.  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  Medtronic Minimed 670G started in July 2017.  Insulin pump downloaded today.  Patient reports glucose levels have been running good until the past week due to holidays-highs noted.  See insulin pump download attached.  No changes made to insulin pump settings. Will await Endo provider appt in Jan 2018.      Guardian Sensor :    Reviewed important CGM concepts and features . Discussed how the sensor measures glucose level in fluid under the skin . The transmitter is connected to the sensor and automatically reads and send glucose to the pump. The transmitter is waterproof and can be worn for 7 days.   Features of the pump will trend your glucose through programmed high, low alerts and display a graph and a glucose reading on the pump screen.     Threshold suspend will suspend insulin delivery when the blood glucose is sensed at low glucose limit set, this feature was turned off per patient request as he feels he can treat glucose when low without needed "Suspend on Low" feature turned on.. The pump can be resumed if patient able to respond. If patient does not respond, the pump will restart on its own in 2 hrs.     Stressed that CGM does not take the place fingerstick blood glucose . Patient should check blood glucose 3-4 times daily. The sensor requires calibration at 2 hrs on initial start, 6 hrs on first day of sensor, and every 12 hrs thereafter.  Recommended pre meal and HS when BG is most stable to try and avoid calibration errors that can result when CBG is changing " quickly.   Patient expectations for the sensor are appropriate and realistic.    On Minimed 670G, Sensor Settings menu turned on. Transmitter connected to patient's insulin pump using Auto Connect.  Sensor menu was programmed with following settings:    Sensor Settings:    High Settings  Alert on high: 250 mg/dL  Alert before high: off  Time before high: off  Rise alert: off  Snooze: 2 hours    Low Settings  Alert on low: 75 mg/dL  Alert before low: off  Suspend on low: off  Snooze: 30 minutes    Reviewed Guardian Sensor insertion using [employer].  Patient successfully inserted sensor into left abdomen without difficulty. Reviewed icons on Minimed 670G main menu and verified sensor and pump are communicating. Discussed troubleshooting with sensor alerts.  Patient uncertain if he has his Medtronic manuals-copied pages from CGM manual for insertion instructions. Patient given Medtronic 24-hour support number and personal cell number.  Patient to refer to Quick Reference guide in training packet for CGM.      Time spent trainin minutes

## 2018-02-14 ENCOUNTER — OFFICE VISIT (OUTPATIENT)
Dept: ENDOCRINOLOGY | Facility: CLINIC | Age: 43
End: 2018-02-14
Payer: COMMERCIAL

## 2018-02-14 VITALS
WEIGHT: 192.69 LBS | BODY MASS INDEX: 26.1 KG/M2 | DIASTOLIC BLOOD PRESSURE: 76 MMHG | HEART RATE: 84 BPM | HEIGHT: 72 IN | SYSTOLIC BLOOD PRESSURE: 118 MMHG

## 2018-02-14 DIAGNOSIS — N52.9 ED (ERECTILE DYSFUNCTION) OF ORGANIC ORIGIN: ICD-10-CM

## 2018-02-14 DIAGNOSIS — Z46.81 INSULIN PUMP FITTING OR ADJUSTMENT: ICD-10-CM

## 2018-02-14 DIAGNOSIS — E10.9 TYPE 1 DIABETES MELLITUS WITHOUT COMPLICATION: Primary | ICD-10-CM

## 2018-02-14 DIAGNOSIS — Z79.4 INSULIN LONG-TERM USE: ICD-10-CM

## 2018-02-14 DIAGNOSIS — Z96.41 INSULIN PUMP STATUS: ICD-10-CM

## 2018-02-14 PROCEDURE — 99215 OFFICE O/P EST HI 40 MIN: CPT | Mod: S$GLB,,, | Performed by: NURSE PRACTITIONER

## 2018-02-14 PROCEDURE — 3008F BODY MASS INDEX DOCD: CPT | Mod: S$GLB,,, | Performed by: NURSE PRACTITIONER

## 2018-02-14 PROCEDURE — 99999 PR PBB SHADOW E&M-EST. PATIENT-LVL IV: CPT | Mod: PBBFAC,,, | Performed by: NURSE PRACTITIONER

## 2018-02-14 NOTE — PROGRESS NOTES
Subjective:      Patient ID: Rajesh Donovan is a 42 y.o. male.    Chief Complaint:  Routine DM f/u     History of Present Illness  Pt is a 42 y.o. WM  with a diagnosis of Type 1 diabetes mellitus diagnosed approximately  2013, as well as chronic conditions pending review including ED.  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 .     Interim Events: No acute events.  Mood improved. Doing better with pump. Requests generic sildenafil for ED. States good benefit with.       Changes site q 3days.   Checks glucoses 8 x a day                 Medtronic 670 G  12 mid---0.4 u/hr atc  Cho 1:14  ISF 1:50.   Goal of 100.       Diabetes Flow Sheet:  Diabetes Medications:  Lantus 15, Humalog  1:15  Diabetes Complications:--testicular neuropathy --started with lantus/levemir   Aspirin:   Statin: none  ACE/ARB:none   Last Urine Microalbumin:   Last Eye exam:  Last Diabetic Education:            Review of Systems   Constitutional: Negative for activity change and fatigue.   HENT: Negative for hearing loss and trouble swallowing.    Eyes: Negative for photophobia and visual disturbance.        Last Eye Exam:    Respiratory: Negative for cough and shortness of breath.    Cardiovascular: Negative for chest pain and palpitations.   Gastrointestinal: Negative for constipation and diarrhea.   Genitourinary: Negative for frequency and urgency.   Musculoskeletal: Negative for arthralgias and myalgias.   Skin: Negative for rash and wound.   Neurological: Negative for weakness and numbness.   Psychiatric/Behavioral: Negative for sleep disturbance. The patient is not nervous/anxious.        Objective:   Physical Exam   Constitutional: He is oriented to person, place, and time. He appears well-developed and well-nourished. No distress.   Appears younger than age, appropriately groomed.    HENT:   Head: Normocephalic  and atraumatic.   Nose: Nose normal.   Mouth/Throat: Oropharynx is clear and moist.   Eyes: Conjunctivae and EOM are normal. Pupils are equal, round, and reactive to light.   Neck: Normal range of motion. Neck supple. No tracheal deviation present. No thyromegaly present.   Cardiovascular: Normal rate, regular rhythm, normal heart sounds and intact distal pulses.  Exam reveals no friction rub.    No murmur heard.  Pulmonary/Chest: Effort normal and breath sounds normal. No respiratory distress. He has no wheezes.   Abdominal: Soft. Bowel sounds are normal. He exhibits no distension. There is no tenderness.   Musculoskeletal: Normal range of motion. He exhibits no edema.   Feet: no open wounds or calluses.  Good pedal care.   Vibratory sensation to feet intact bilaterally   Lymphadenopathy:     He has no cervical adenopathy.   Neurological: He is alert and oriented to person, place, and time. He has normal reflexes. No cranial nerve deficit. He exhibits normal muscle tone. Coordination normal.   Skin: Skin is warm and dry. No rash noted. He is not diaphoretic. No erythema.   Psychiatric: He has a normal mood and affect. His behavior is normal. Judgment and thought content normal.       Lab Review:   Hemoglobin A1C   Date Value Ref Range Status   12/27/2017 6.5 (H) 4.0 - 5.6 % Final     Comment:     According to ADA guidelines, hemoglobin A1c <7.0% represents  optimal control in non-pregnant diabetic patients. Different  metrics may apply to specific patient populations.   Standards of Medical Care in Diabetes-2016.  For the purpose of screening for the presence of diabetes:  <5.7%     Consistent with the absence of diabetes  5.7-6.4%  Consistent with increasing risk for diabetes   (prediabetes)  >or=6.5%  Consistent with diabetes  Currently, no consensus exists for use of hemoglobin A1c  for diagnosis of diabetes for children.  This Hemoglobin A1c assay has significant interference with fetal   hemoglobin   (HbF).  The results are invalid for patients with abnormal amounts of   HbF,   including those with known Hereditary Persistence   of Fetal Hemoglobin. Heterozygous hemoglobin variants (HbAS, HbAC,   HbAD, HbAE, HbA2) do not significantly interfere with this assay;   however, presence of multiple variants in a sample may impact the %   interference.     01/07/2017 6.7 (H) 4.5 - 6.2 % Final     Comment:     According to ADA guidelines, hemoglobin A1C <7.0% represents  optimal control in non-pregnant diabetic patients.  Different  metrics may apply to specific populations.   Standards of Medical Care in Diabetes - 2016.  For the purpose of screening for the presence of diabetes:  <5.7%     Consistent with the absence of diabetes  5.7-6.4%  Consistent with increasing risk for diabetes   (prediabetes)  >or=6.5%  Consistent with diabetes  Currently no consensus exists for use of hemoglobin A1C  for diagnosis of diabetes for children.     04/30/2016 6.5 (H) 4.5 - 6.2 % Final       Chemistry        Component Value Date/Time     12/27/2017 1202    K 4.5 12/27/2017 1202     12/27/2017 1202    CO2 30 (H) 12/27/2017 1202    BUN 18 12/27/2017 1202    CREATININE 1.0 12/27/2017 1202     (H) 12/27/2017 1202        Component Value Date/Time    CALCIUM 10.1 12/27/2017 1202    ALKPHOS 67 12/27/2017 1202    AST 26 12/27/2017 1202    ALT 22 12/27/2017 1202    BILITOT 1.0 12/27/2017 1202        Lab Results   Component Value Date    LDLCALC 102.8 01/07/2017     Lab Results   Component Value Date    TSH 1.426 04/12/2014       Assessment:     1. Type 1 diabetes mellitus without complication  Chronic-optimal-switch to automode   2. ED (erectile dysfunction) of organic origin  -chronic-improved with sildenafil--will send rx in 20 mg (2 1/2 to 5 tabs prn) when pt requests   3. Insulin pump status     4. Insulin long-term use     5. Insulin pump fitting or adjustment           Plan:     Put in Automode  Change IOB to 3 hrs.       ORDERS 02/14/2018   F/u early August with fasting cmp, lipids, a1c, urine m/c prior       40 min >50% counseling on pump expectations, changing  automode settings , learning curve, reassured.

## 2018-02-19 ENCOUNTER — PATIENT MESSAGE (OUTPATIENT)
Dept: ENDOCRINOLOGY | Facility: CLINIC | Age: 43
End: 2018-02-19

## 2018-05-03 ENCOUNTER — PATIENT MESSAGE (OUTPATIENT)
Dept: ENDOCRINOLOGY | Facility: CLINIC | Age: 43
End: 2018-05-03

## 2018-05-04 DIAGNOSIS — E10.9 TYPE 1 DIABETES MELLITUS WITHOUT COMPLICATION: ICD-10-CM

## 2018-05-04 RX ORDER — INSULIN LISPRO 100 [IU]/ML
INJECTION, SOLUTION INTRAVENOUS; SUBCUTANEOUS
Qty: 15 SYRINGE | Refills: 3 | Status: SHIPPED | OUTPATIENT
Start: 2018-05-04 | End: 2019-07-10 | Stop reason: SDUPTHER

## 2018-05-04 RX ORDER — PEN NEEDLE, DIABETIC 30 GX3/16"
NEEDLE, DISPOSABLE MISCELLANEOUS
Qty: 150 EACH | Refills: 12 | Status: SHIPPED | OUTPATIENT
Start: 2018-05-04 | End: 2019-07-23 | Stop reason: SDUPTHER

## 2018-05-04 RX ORDER — INSULIN GLARGINE 100 [IU]/ML
15 INJECTION, SOLUTION SUBCUTANEOUS NIGHTLY
Qty: 1 BOX | Refills: 6 | Status: SHIPPED | OUTPATIENT
Start: 2018-05-04 | End: 2019-05-14 | Stop reason: SDUPTHER

## 2018-06-14 DIAGNOSIS — E10.9 TYPE 1 DIABETES MELLITUS WITHOUT COMPLICATION: ICD-10-CM

## 2018-07-17 ENCOUNTER — LAB VISIT (OUTPATIENT)
Dept: LAB | Facility: HOSPITAL | Age: 43
End: 2018-07-17
Attending: NURSE PRACTITIONER
Payer: COMMERCIAL

## 2018-07-17 DIAGNOSIS — E10.9 TYPE 1 DIABETES MELLITUS WITHOUT COMPLICATION: ICD-10-CM

## 2018-07-17 LAB
ALBUMIN SERPL BCP-MCNC: 4.4 G/DL
ALP SERPL-CCNC: 56 U/L
ALT SERPL W/O P-5'-P-CCNC: 95 U/L
ANION GAP SERPL CALC-SCNC: 14 MMOL/L
AST SERPL-CCNC: 95 U/L
BILIRUB SERPL-MCNC: 1 MG/DL
BUN SERPL-MCNC: 16 MG/DL
CALCIUM SERPL-MCNC: 9.7 MG/DL
CHLORIDE SERPL-SCNC: 99 MMOL/L
CHOLEST SERPL-MCNC: 179 MG/DL
CHOLEST/HDLC SERPL: 2.1 {RATIO}
CO2 SERPL-SCNC: 26 MMOL/L
CREAT SERPL-MCNC: 1.1 MG/DL
EST. GFR  (AFRICAN AMERICAN): >60 ML/MIN/1.73 M^2
EST. GFR  (NON AFRICAN AMERICAN): >60 ML/MIN/1.73 M^2
ESTIMATED AVG GLUCOSE: 143 MG/DL
GLUCOSE SERPL-MCNC: 55 MG/DL
HBA1C MFR BLD HPLC: 6.6 %
HDLC SERPL-MCNC: 84 MG/DL
HDLC SERPL: 46.9 %
LDLC SERPL CALC-MCNC: 71.4 MG/DL
NONHDLC SERPL-MCNC: 95 MG/DL
POTASSIUM SERPL-SCNC: 3.6 MMOL/L
PROT SERPL-MCNC: 7.8 G/DL
SODIUM SERPL-SCNC: 139 MMOL/L
TRIGL SERPL-MCNC: 118 MG/DL

## 2018-07-17 PROCEDURE — 80053 COMPREHEN METABOLIC PANEL: CPT

## 2018-07-17 PROCEDURE — 36415 COLL VENOUS BLD VENIPUNCTURE: CPT | Mod: PO

## 2018-07-17 PROCEDURE — 80061 LIPID PANEL: CPT

## 2018-07-17 PROCEDURE — 83036 HEMOGLOBIN GLYCOSYLATED A1C: CPT

## 2018-07-24 ENCOUNTER — OFFICE VISIT (OUTPATIENT)
Dept: ENDOCRINOLOGY | Facility: CLINIC | Age: 43
End: 2018-07-24
Payer: COMMERCIAL

## 2018-07-24 VITALS
HEIGHT: 72 IN | DIASTOLIC BLOOD PRESSURE: 76 MMHG | WEIGHT: 194.88 LBS | BODY MASS INDEX: 26.4 KG/M2 | RESPIRATION RATE: 18 BRPM | SYSTOLIC BLOOD PRESSURE: 118 MMHG | HEART RATE: 74 BPM

## 2018-07-24 DIAGNOSIS — E10.9 TYPE 1 DIABETES MELLITUS WITHOUT COMPLICATION: Primary | ICD-10-CM

## 2018-07-24 PROBLEM — Z96.41 INSULIN PUMP STATUS: Status: RESOLVED | Noted: 2017-07-26 | Resolved: 2018-07-24

## 2018-07-24 PROBLEM — Z46.81 INSULIN PUMP FITTING OR ADJUSTMENT: Status: RESOLVED | Noted: 2017-07-26 | Resolved: 2018-07-24

## 2018-07-24 PROCEDURE — 99213 OFFICE O/P EST LOW 20 MIN: CPT | Mod: S$GLB,,, | Performed by: NURSE PRACTITIONER

## 2018-07-24 PROCEDURE — 99999 PR PBB SHADOW E&M-EST. PATIENT-LVL III: CPT | Mod: PBBFAC,,, | Performed by: NURSE PRACTITIONER

## 2018-07-24 RX ORDER — ESCITALOPRAM OXALATE 20 MG/1
20 TABLET ORAL DAILY
COMMUNITY
End: 2019-07-23

## 2018-07-24 NOTE — PROGRESS NOTES
Subjective:      Patient ID: Rajesh Donovan is a 42 y.o. male.    Chief Complaint:  Routine DM f/u     History of Present Illness  Pt is a 42 y.o. WM  with a diagnosis of Type 1 diabetes mellitus diagnosed approximately  2013, as well as chronic conditions pending review including ED.  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    Interim Events: No acute events.  Mood improved. Discontinued pump. Overall aggravated with it with his sports,and also had issues with supplies. Denies any significant hypoglycemia.      Checking glucoses 5 times a day.   Humalog 1:12, ISF    Lantus 12 qhs.        Diabetes Flow Sheet:  Diabetes Medications:  Lantus 12, Humalog  1:12, ISF 1:50   Diabetes Complications:--testicular neuropathy --started with lantus/levemir   Aspirin:   Statin: none  ACE/ARB:none   Last Urine Microalbumin:   Last Eye exam:  Last Diabetic Education:            Review of Systems   Constitutional: Negative for activity change and fatigue.   HENT: Negative for hearing loss and trouble swallowing.    Eyes: Negative for photophobia and visual disturbance.        Last Eye Exam:    Respiratory: Negative for cough and shortness of breath.    Cardiovascular: Negative for chest pain and palpitations.   Gastrointestinal: Negative for constipation and diarrhea.   Genitourinary: Negative for frequency and urgency.   Musculoskeletal: Negative for arthralgias and myalgias.   Skin: Negative for rash and wound.   Neurological: Negative for weakness and numbness.   Psychiatric/Behavioral: Negative for sleep disturbance. The patient is not nervous/anxious.        Objective:   Physical Exam   Constitutional: He is oriented to person, place, and time. He appears well-developed and well-nourished. No distress.   Appears younger than age, appropriately groomed.    HENT:   Head: Normocephalic and atraumatic.   Nose: Nose normal.    Mouth/Throat: Oropharynx is clear and moist.   Eyes: Conjunctivae and EOM are normal. Pupils are equal, round, and reactive to light.   Neck: Normal range of motion. Neck supple. No tracheal deviation present. No thyromegaly present.   Cardiovascular: Normal rate, regular rhythm, normal heart sounds and intact distal pulses.  Exam reveals no friction rub.    No murmur heard.  Pulmonary/Chest: Effort normal and breath sounds normal. No respiratory distress. He has no wheezes.   Abdominal: Soft. Bowel sounds are normal. He exhibits no distension. There is no tenderness.   Musculoskeletal: Normal range of motion. He exhibits no edema.   Feet: no open wounds or calluses.  Good pedal care.   Vibratory sensation to feet intact bilaterally   Lymphadenopathy:     He has no cervical adenopathy.   Neurological: He is alert and oriented to person, place, and time. He has normal reflexes. No cranial nerve deficit. He exhibits normal muscle tone. Coordination normal.   Skin: Skin is warm and dry. No rash noted. He is not diaphoretic. No erythema.   Psychiatric: He has a normal mood and affect. His behavior is normal. Judgment and thought content normal.       Lab Review:   Hemoglobin A1C   Date Value Ref Range Status   07/17/2018 6.6 (H) 4.0 - 5.6 % Final     Comment:     ADA Screening Guidelines:  5.7-6.4%  Consistent with prediabetes  >or=6.5%  Consistent with diabetes  High levels of fetal hemoglobin interfere with the HbA1C  assay. Heterozygous hemoglobin variants (HbS, HgC, etc)do  not significantly interfere with this assay.   However, presence of multiple variants may affect accuracy.     12/27/2017 6.5 (H) 4.0 - 5.6 % Final     Comment:     According to ADA guidelines, hemoglobin A1c <7.0% represents  optimal control in non-pregnant diabetic patients. Different  metrics may apply to specific patient populations.   Standards of Medical Care in Diabetes-2016.  For the purpose of screening for the presence of  diabetes:  <5.7%     Consistent with the absence of diabetes  5.7-6.4%  Consistent with increasing risk for diabetes   (prediabetes)  >or=6.5%  Consistent with diabetes  Currently, no consensus exists for use of hemoglobin A1c  for diagnosis of diabetes for children.  This Hemoglobin A1c assay has significant interference with fetal   hemoglobin   (HbF). The results are invalid for patients with abnormal amounts of   HbF,   including those with known Hereditary Persistence   of Fetal Hemoglobin. Heterozygous hemoglobin variants (HbAS, HbAC,   HbAD, HbAE, HbA2) do not significantly interfere with this assay;   however, presence of multiple variants in a sample may impact the %   interference.     01/07/2017 6.7 (H) 4.5 - 6.2 % Final     Comment:     According to ADA guidelines, hemoglobin A1C <7.0% represents  optimal control in non-pregnant diabetic patients.  Different  metrics may apply to specific populations.   Standards of Medical Care in Diabetes - 2016.  For the purpose of screening for the presence of diabetes:  <5.7%     Consistent with the absence of diabetes  5.7-6.4%  Consistent with increasing risk for diabetes   (prediabetes)  >or=6.5%  Consistent with diabetes  Currently no consensus exists for use of hemoglobin A1C  for diagnosis of diabetes for children.         Chemistry        Component Value Date/Time     07/17/2018 0944    K 3.6 07/17/2018 0944    CL 99 07/17/2018 0944    CO2 26 07/17/2018 0944    BUN 16 07/17/2018 0944    CREATININE 1.1 07/17/2018 0944    GLU 55 (L) 07/17/2018 0944        Component Value Date/Time    CALCIUM 9.7 07/17/2018 0944    ALKPHOS 56 07/17/2018 0944    AST 95 (H) 07/17/2018 0944    ALT 95 (H) 07/17/2018 0944    BILITOT 1.0 07/17/2018 0944        Lab Results   Component Value Date    LDLCALC 71.4 07/17/2018     Lab Results   Component Value Date    TSH 1.426 04/12/2014     Component      Latest Ref Rng & Units 7/17/2018   Microalbum.,U,Random      ug/mL 3.0    Creatinine, Random Ur      23.0 - 375.0 mg/dL 114.0   Microalb Creat Ratio      0.0 - 30.0 ug/mg 2.6       Assessment:     1. Type 1 diabetes mellitus without complication  HM DIABETES FOOT EXAM    Diabetic Eye Screening Photo    Hemoglobin A1c  Chronic-stable-no change.          Plan:   Cont lantus 12,  Novolog CHO 1:12, and ISF 1:50   Needs eye exam--no insurance   Needs ace and statin per recs       ORDERS 07/24/2018     F/u Abigail break--put on list once my schedule is finalized.    Eye camera-same time.

## 2018-08-06 ENCOUNTER — PATIENT MESSAGE (OUTPATIENT)
Dept: ENDOCRINOLOGY | Facility: CLINIC | Age: 43
End: 2018-08-06

## 2018-09-11 RX ORDER — LISINOPRIL AND HYDROCHLOROTHIAZIDE 12.5; 2 MG/1; MG/1
1 TABLET ORAL DAILY
Qty: 90 TABLET | Refills: 3 | Status: SHIPPED | OUTPATIENT
Start: 2018-09-11 | End: 2019-07-23 | Stop reason: SDUPTHER

## 2018-10-10 ENCOUNTER — PATIENT MESSAGE (OUTPATIENT)
Dept: ENDOCRINOLOGY | Facility: CLINIC | Age: 43
End: 2018-10-10

## 2018-10-10 RX ORDER — SILDENAFIL CITRATE 20 MG/1
TABLET ORAL
Qty: 30 TABLET | Refills: 11 | Status: SHIPPED | OUTPATIENT
Start: 2018-10-10 | End: 2019-11-05 | Stop reason: SDUPTHER

## 2018-11-05 ENCOUNTER — PATIENT MESSAGE (OUTPATIENT)
Dept: ENDOCRINOLOGY | Facility: CLINIC | Age: 43
End: 2018-11-05

## 2018-11-28 ENCOUNTER — PATIENT MESSAGE (OUTPATIENT)
Dept: ENDOCRINOLOGY | Facility: CLINIC | Age: 43
End: 2018-11-28

## 2019-01-10 ENCOUNTER — PATIENT MESSAGE (OUTPATIENT)
Dept: ENDOCRINOLOGY | Facility: CLINIC | Age: 44
End: 2019-01-10

## 2019-03-08 DIAGNOSIS — E10.9 TYPE 1 DIABETES MELLITUS WITHOUT COMPLICATION: ICD-10-CM

## 2019-03-10 ENCOUNTER — PATIENT MESSAGE (OUTPATIENT)
Dept: ENDOCRINOLOGY | Facility: CLINIC | Age: 44
End: 2019-03-10

## 2019-03-10 DIAGNOSIS — E10.9 TYPE 1 DIABETES MELLITUS WITHOUT COMPLICATION: ICD-10-CM

## 2019-04-01 ENCOUNTER — PATIENT MESSAGE (OUTPATIENT)
Dept: ENDOCRINOLOGY | Facility: CLINIC | Age: 44
End: 2019-04-01

## 2019-04-15 ENCOUNTER — PATIENT MESSAGE (OUTPATIENT)
Dept: ENDOCRINOLOGY | Facility: CLINIC | Age: 44
End: 2019-04-15

## 2019-04-15 ENCOUNTER — TELEPHONE (OUTPATIENT)
Dept: ENDOCRINOLOGY | Facility: CLINIC | Age: 44
End: 2019-04-15

## 2019-04-22 ENCOUNTER — LAB VISIT (OUTPATIENT)
Dept: LAB | Facility: HOSPITAL | Age: 44
End: 2019-04-22
Attending: NURSE PRACTITIONER
Payer: COMMERCIAL

## 2019-04-22 DIAGNOSIS — E10.9 TYPE 1 DIABETES MELLITUS WITHOUT COMPLICATION: ICD-10-CM

## 2019-04-22 LAB
ESTIMATED AVG GLUCOSE: 128 MG/DL (ref 68–131)
HBA1C MFR BLD HPLC: 6.1 % (ref 4–5.6)

## 2019-04-22 PROCEDURE — 36415 COLL VENOUS BLD VENIPUNCTURE: CPT | Mod: PO

## 2019-04-22 PROCEDURE — 83036 HEMOGLOBIN GLYCOSYLATED A1C: CPT

## 2019-05-14 RX ORDER — INSULIN GLARGINE 100 [IU]/ML
INJECTION, SOLUTION SUBCUTANEOUS
Qty: 15 SYRINGE | Refills: 2 | Status: SHIPPED | OUTPATIENT
Start: 2019-05-14 | End: 2019-07-23 | Stop reason: SDUPTHER

## 2019-07-10 DIAGNOSIS — E10.9 TYPE 1 DIABETES MELLITUS WITHOUT COMPLICATION: ICD-10-CM

## 2019-07-10 RX ORDER — INSULIN LISPRO 100 [IU]/ML
INJECTION, SOLUTION INTRAVENOUS; SUBCUTANEOUS
Qty: 45 SYRINGE | Refills: 3 | Status: SHIPPED | OUTPATIENT
Start: 2019-07-10 | End: 2019-07-23 | Stop reason: SDUPTHER

## 2019-07-23 ENCOUNTER — OFFICE VISIT (OUTPATIENT)
Dept: ENDOCRINOLOGY | Facility: CLINIC | Age: 44
End: 2019-07-23
Payer: COMMERCIAL

## 2019-07-23 VITALS
DIASTOLIC BLOOD PRESSURE: 84 MMHG | WEIGHT: 205.56 LBS | BODY MASS INDEX: 27.84 KG/M2 | HEART RATE: 68 BPM | SYSTOLIC BLOOD PRESSURE: 122 MMHG | HEIGHT: 72 IN

## 2019-07-23 DIAGNOSIS — E10.9 TYPE 1 DIABETES MELLITUS WITHOUT COMPLICATION: Primary | ICD-10-CM

## 2019-07-23 DIAGNOSIS — I10 ESSENTIAL HYPERTENSION: ICD-10-CM

## 2019-07-23 DIAGNOSIS — G63 POLYNEUROPATHY ASSOCIATED WITH UNDERLYING DISEASE: ICD-10-CM

## 2019-07-23 DIAGNOSIS — G40.909 SEIZURE DISORDER: ICD-10-CM

## 2019-07-23 PROCEDURE — 99214 PR OFFICE/OUTPT VISIT, EST, LEVL IV, 30-39 MIN: ICD-10-PCS | Mod: S$GLB,,, | Performed by: NURSE PRACTITIONER

## 2019-07-23 PROCEDURE — 99214 OFFICE O/P EST MOD 30 MIN: CPT | Mod: S$GLB,,, | Performed by: NURSE PRACTITIONER

## 2019-07-23 PROCEDURE — 99999 PR PBB SHADOW E&M-EST. PATIENT-LVL III: CPT | Mod: PBBFAC,,, | Performed by: NURSE PRACTITIONER

## 2019-07-23 PROCEDURE — 99999 PR PBB SHADOW E&M-EST. PATIENT-LVL III: ICD-10-PCS | Mod: PBBFAC,,, | Performed by: NURSE PRACTITIONER

## 2019-07-23 RX ORDER — GABAPENTIN 600 MG/1
900 TABLET ORAL 2 TIMES DAILY
Qty: 135 TABLET | Refills: 3 | Status: SHIPPED | OUTPATIENT
Start: 2019-07-23 | End: 2022-04-18 | Stop reason: SDUPTHER

## 2019-07-23 RX ORDER — PEN NEEDLE, DIABETIC 30 GX3/16"
NEEDLE, DISPOSABLE MISCELLANEOUS
Qty: 150 EACH | Refills: 12 | Status: SHIPPED | OUTPATIENT
Start: 2019-07-23

## 2019-07-23 RX ORDER — INSULIN GLARGINE 100 [IU]/ML
INJECTION, SOLUTION SUBCUTANEOUS
Qty: 15 SYRINGE | Refills: 2 | Status: SHIPPED | OUTPATIENT
Start: 2019-07-23 | End: 2021-06-29 | Stop reason: ALTCHOICE

## 2019-07-23 RX ORDER — INSULIN LISPRO 100 [IU]/ML
INJECTION, SOLUTION INTRAVENOUS; SUBCUTANEOUS
Qty: 45 SYRINGE | Refills: 3 | Status: SHIPPED | OUTPATIENT
Start: 2019-07-23 | End: 2020-01-23 | Stop reason: SDUPTHER

## 2019-07-23 RX ORDER — LEVETIRACETAM 750 MG/1
750 TABLET ORAL 2 TIMES DAILY
Qty: 180 TABLET | Refills: 3 | Status: SHIPPED | OUTPATIENT
Start: 2019-07-23 | End: 2022-04-18 | Stop reason: SDUPTHER

## 2019-07-23 RX ORDER — LISINOPRIL AND HYDROCHLOROTHIAZIDE 12.5; 2 MG/1; MG/1
1 TABLET ORAL DAILY
Qty: 90 TABLET | Refills: 3 | Status: SHIPPED | OUTPATIENT
Start: 2019-07-23 | End: 2020-08-25

## 2019-07-23 NOTE — PROGRESS NOTES
Subjective:      Patient ID: Rajesh Donovan is a 43 y.o. male.    Chief Complaint:  Routine DM f/u     History of Present Illness  Pt is a 43 y.o. WM  with a diagnosis of Type 1 diabetes mellitus diagnosed approximately  2013, as well as chronic conditions pending review including ED.  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    Interim Events: No acute events.  Denies any significant hypoglycemia. C/o LE neuropathy.  NO diurnal variations.       Checking glucoses 5 times a day.   Humalog 1:12, ISF    Lantus 12 qhs.      FBS--120  Lunches--120's  Supper-120's.   Bt 120's.        Diabetes Flow Sheet:  Diabetes Medications:  Lantus 12, Humalog  1:12, ISF 1:50   Diabetes Complications:--testicular neuropathy --started with lantus/levemir   Aspirin:   Statin: none  ACE/ARB:none   Last Urine Microalbumin:   Last Eye exam:  Last Diabetic Education:            Review of Systems   Constitutional: Negative for activity change and fatigue.   HENT: Negative for hearing loss and trouble swallowing.    Eyes: Negative for photophobia and visual disturbance.        Last Eye Exam:    Respiratory: Negative for cough and shortness of breath.    Cardiovascular: Negative for chest pain and palpitations.   Gastrointestinal: Negative for constipation and diarrhea.   Genitourinary: Negative for frequency and urgency.   Musculoskeletal: Negative for arthralgias and myalgias.   Skin: Negative for rash and wound.   Neurological: Negative for weakness and numbness.   Psychiatric/Behavioral: Negative for sleep disturbance. The patient is not nervous/anxious.        Objective:   Physical Exam   Constitutional: He is oriented to person, place, and time. He appears well-developed and well-nourished. No distress.   Appears younger than age, appropriately groomed.    HENT:   Head: Normocephalic and atraumatic.   Nose: Nose normal.    Mouth/Throat: Oropharynx is clear and moist.   Eyes: Pupils are equal, round, and reactive to light. Conjunctivae and EOM are normal.   Neck: Normal range of motion. Neck supple. No tracheal deviation present. No thyromegaly present.   Cardiovascular: Normal rate, regular rhythm, normal heart sounds and intact distal pulses. Exam reveals no friction rub.   No murmur heard.  Pulmonary/Chest: Effort normal and breath sounds normal. No respiratory distress. He has no wheezes.   Abdominal: Soft. Bowel sounds are normal. He exhibits no distension. There is no tenderness.   Musculoskeletal: Normal range of motion. He exhibits no edema.   Feet: no open wounds or calluses.  Good pedal care.   Vibratory sensation to feet intact bilaterally   Lymphadenopathy:     He has no cervical adenopathy.   Neurological: He is alert and oriented to person, place, and time. He has normal reflexes. No cranial nerve deficit. He exhibits normal muscle tone. Coordination normal.   Skin: Skin is warm and dry. No rash noted. He is not diaphoretic. No erythema.   Psychiatric: He has a normal mood and affect. His behavior is normal. Judgment and thought content normal.       Lab Review:   Hemoglobin A1C   Date Value Ref Range Status   04/22/2019 6.1 (H) 4.0 - 5.6 % Final     Comment:     ADA Screening Guidelines:  5.7-6.4%  Consistent with prediabetes  >or=6.5%  Consistent with diabetes  High levels of fetal hemoglobin interfere with the HbA1C  assay. Heterozygous hemoglobin variants (HbS, HgC, etc)do  not significantly interfere with this assay.   However, presence of multiple variants may affect accuracy.     07/17/2018 6.6 (H) 4.0 - 5.6 % Final     Comment:     ADA Screening Guidelines:  5.7-6.4%  Consistent with prediabetes  >or=6.5%  Consistent with diabetes  High levels of fetal hemoglobin interfere with the HbA1C  assay. Heterozygous hemoglobin variants (HbS, HgC, etc)do  not significantly interfere with this assay.   However, presence  of multiple variants may affect accuracy.     12/27/2017 6.5 (H) 4.0 - 5.6 % Final     Comment:     According to ADA guidelines, hemoglobin A1c <7.0% represents  optimal control in non-pregnant diabetic patients. Different  metrics may apply to specific patient populations.   Standards of Medical Care in Diabetes-2016.  For the purpose of screening for the presence of diabetes:  <5.7%     Consistent with the absence of diabetes  5.7-6.4%  Consistent with increasing risk for diabetes   (prediabetes)  >or=6.5%  Consistent with diabetes  Currently, no consensus exists for use of hemoglobin A1c  for diagnosis of diabetes for children.  This Hemoglobin A1c assay has significant interference with fetal   hemoglobin   (HbF). The results are invalid for patients with abnormal amounts of   HbF,   including those with known Hereditary Persistence   of Fetal Hemoglobin. Heterozygous hemoglobin variants (HbAS, HbAC,   HbAD, HbAE, HbA2) do not significantly interfere with this assay;   however, presence of multiple variants in a sample may impact the %   interference.         Chemistry        Component Value Date/Time     07/17/2018 0944    K 3.6 07/17/2018 0944    CL 99 07/17/2018 0944    CO2 26 07/17/2018 0944    BUN 16 07/17/2018 0944    CREATININE 1.1 07/17/2018 0944    GLU 55 (L) 07/17/2018 0944        Component Value Date/Time    CALCIUM 9.7 07/17/2018 0944    ALKPHOS 56 07/17/2018 0944    AST 95 (H) 07/17/2018 0944    ALT 95 (H) 07/17/2018 0944    BILITOT 1.0 07/17/2018 0944        Lab Results   Component Value Date    LDLCALC 71.4 07/17/2018     Lab Results   Component Value Date    TSH 1.426 04/12/2014     Component      Latest Ref Rng & Units 7/17/2018   Microalbum.,U,Random      ug/mL 3.0   Creatinine, Random Ur      23.0 - 375.0 mg/dL 114.0   Microalb Creat Ratio      0.0 - 30.0 ug/mg 2.6       Assessment:       1. Type 1 diabetes mellitus without complication  Comprehensive metabolic panel    Hemoglobin A1c     "Lipid panel    Microalbumin/creatinine urine ratio    TSH    insulin lispro (HUMALOG KWIKPEN INSULIN) 100 unit/mL pen    insulin (LANTUS SOLOSTAR U-100 INSULIN) glargine 100 units/mL (3mL) SubQ pen    lisinopril-hydrochlorothiazide (PRINZIDE,ZESTORETIC) 20-12.5 mg per tablet    pen needle, diabetic (BD ULTRA-FINE ÁNGEL PEN NEEDLE) 32 gauge x 5/32" Ndle    Hemoglobin A1c  Chronic-stable-no change   2. Seizure disorder  levETIRAcetam (KEPPRA) 750 MG Tab  Chronic-stable-no change    3. Polyneuropathy associated with underlying disease  gabapentin (NEURONTIN) 600 MG tablet  Chronic-worsened-no change    4. Essential hypertension  Chronic-stable-no change.          Plan:   Cont lantus 12,  Novolog CHO 1:12, and ISF 1:50   Needs eye exam--has insurance now--last appt about a year ago.    Needs statin per recs       ORDERS 07/23/2019     Fasting cmp, lipids, a1c, tsh, urine m/c  ---next Mon or Tuesday.        F/u [facility]--put on list once my schedule is finalized--a1c prior       "

## 2019-07-30 ENCOUNTER — LAB VISIT (OUTPATIENT)
Dept: LAB | Facility: HOSPITAL | Age: 44
End: 2019-07-30
Attending: NURSE PRACTITIONER
Payer: COMMERCIAL

## 2019-07-30 DIAGNOSIS — E10.9 TYPE 1 DIABETES MELLITUS WITHOUT COMPLICATION: ICD-10-CM

## 2019-07-30 LAB
ALBUMIN SERPL BCP-MCNC: 3.9 G/DL (ref 3.5–5.2)
ALP SERPL-CCNC: 60 U/L (ref 55–135)
ALT SERPL W/O P-5'-P-CCNC: 23 U/L (ref 10–44)
ANION GAP SERPL CALC-SCNC: 10 MMOL/L (ref 8–16)
AST SERPL-CCNC: 24 U/L (ref 10–40)
BILIRUB SERPL-MCNC: 0.6 MG/DL (ref 0.1–1)
BUN SERPL-MCNC: 16 MG/DL (ref 6–20)
CALCIUM SERPL-MCNC: 9.4 MG/DL (ref 8.7–10.5)
CHLORIDE SERPL-SCNC: 102 MMOL/L (ref 95–110)
CHOLEST SERPL-MCNC: 177 MG/DL (ref 120–199)
CHOLEST/HDLC SERPL: 2.2 {RATIO} (ref 2–5)
CO2 SERPL-SCNC: 27 MMOL/L (ref 23–29)
CREAT SERPL-MCNC: 1.2 MG/DL (ref 0.5–1.4)
EST. GFR  (AFRICAN AMERICAN): >60 ML/MIN/1.73 M^2
EST. GFR  (NON AFRICAN AMERICAN): >60 ML/MIN/1.73 M^2
ESTIMATED AVG GLUCOSE: 151 MG/DL (ref 68–131)
GLUCOSE SERPL-MCNC: 194 MG/DL (ref 70–110)
HBA1C MFR BLD HPLC: 6.9 % (ref 4–5.6)
HDLC SERPL-MCNC: 80 MG/DL (ref 40–75)
HDLC SERPL: 45.2 % (ref 20–50)
LDLC SERPL CALC-MCNC: 83.2 MG/DL (ref 63–159)
NONHDLC SERPL-MCNC: 97 MG/DL
POTASSIUM SERPL-SCNC: 3.9 MMOL/L (ref 3.5–5.1)
PROT SERPL-MCNC: 7.1 G/DL (ref 6–8.4)
SODIUM SERPL-SCNC: 139 MMOL/L (ref 136–145)
TRIGL SERPL-MCNC: 69 MG/DL (ref 30–150)
TSH SERPL DL<=0.005 MIU/L-ACNC: 3.29 UIU/ML (ref 0.4–4)

## 2019-07-30 PROCEDURE — 36415 COLL VENOUS BLD VENIPUNCTURE: CPT | Mod: PO

## 2019-07-30 PROCEDURE — 80053 COMPREHEN METABOLIC PANEL: CPT

## 2019-07-30 PROCEDURE — 80061 LIPID PANEL: CPT

## 2019-07-30 PROCEDURE — 83036 HEMOGLOBIN GLYCOSYLATED A1C: CPT

## 2019-07-30 PROCEDURE — 84443 ASSAY THYROID STIM HORMONE: CPT

## 2019-10-07 ENCOUNTER — PATIENT MESSAGE (OUTPATIENT)
Dept: ENDOCRINOLOGY | Facility: CLINIC | Age: 44
End: 2019-10-07

## 2019-10-11 ENCOUNTER — PATIENT MESSAGE (OUTPATIENT)
Dept: ENDOCRINOLOGY | Facility: CLINIC | Age: 44
End: 2019-10-11

## 2019-10-17 ENCOUNTER — PATIENT MESSAGE (OUTPATIENT)
Dept: ENDOCRINOLOGY | Facility: CLINIC | Age: 44
End: 2019-10-17

## 2019-10-17 ENCOUNTER — TELEPHONE (OUTPATIENT)
Dept: ENDOCRINOLOGY | Facility: CLINIC | Age: 44
End: 2019-10-17

## 2019-10-17 ENCOUNTER — OFFICE VISIT (OUTPATIENT)
Dept: ENDOCRINOLOGY | Facility: CLINIC | Age: 44
End: 2019-10-17
Payer: COMMERCIAL

## 2019-10-17 VITALS
DIASTOLIC BLOOD PRESSURE: 72 MMHG | HEART RATE: 88 BPM | RESPIRATION RATE: 18 BRPM | HEIGHT: 72 IN | WEIGHT: 208 LBS | BODY MASS INDEX: 28.17 KG/M2 | SYSTOLIC BLOOD PRESSURE: 110 MMHG

## 2019-10-17 DIAGNOSIS — G63 POLYNEUROPATHY ASSOCIATED WITH UNDERLYING DISEASE: ICD-10-CM

## 2019-10-17 DIAGNOSIS — E10.9 TYPE 1 DIABETES MELLITUS WITHOUT COMPLICATION: Primary | ICD-10-CM

## 2019-10-17 DIAGNOSIS — E11.649 HYPOGLYCEMIA ASSOCIATED WITH DIABETES: ICD-10-CM

## 2019-10-17 DIAGNOSIS — I10 ESSENTIAL HYPERTENSION: ICD-10-CM

## 2019-10-17 PROCEDURE — 99214 PR OFFICE/OUTPT VISIT, EST, LEVL IV, 30-39 MIN: ICD-10-PCS | Mod: S$GLB,,, | Performed by: NURSE PRACTITIONER

## 2019-10-17 PROCEDURE — 99999 PR PBB SHADOW E&M-EST. PATIENT-LVL III: ICD-10-PCS | Mod: PBBFAC,,, | Performed by: NURSE PRACTITIONER

## 2019-10-17 PROCEDURE — 99999 PR PBB SHADOW E&M-EST. PATIENT-LVL III: CPT | Mod: PBBFAC,,, | Performed by: NURSE PRACTITIONER

## 2019-10-17 PROCEDURE — 99214 OFFICE O/P EST MOD 30 MIN: CPT | Mod: S$GLB,,, | Performed by: NURSE PRACTITIONER

## 2019-10-17 NOTE — PROGRESS NOTES
Subjective:      Patient ID: Rajesh Donovan is a 43 y.o. male.    Chief Complaint:  Routine DM f/u     History of Present Illness  Pt is a 43 y.o. WM  with a diagnosis of Type 1 diabetes mellitus diagnosed approximately  2013, as well as chronic conditions pending review including ED.  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 He did have a pump for about a year and hated it.     Interim Events: Back early r/t concerns of deteriorating glucoses.  Brings logs.  But hard to follow. ALso some episodes of unexplainable hypoglycemia.  Finds glucoses not responding to correction doses as well as previously or to very accurate carb count.  Had a couple of unexplained hypglycemic events--one early morning, glucose in 40's--though pt was more sedentary than usual the day before r/t rain.  Also similar event at work.  Otherwise no focal complaints..  Does question whether improved sexual activity could be contributary.  He is not as active in sports recently, and gained 15 lbs over the last year.  No acute events.      Checking glucoses 5 times a day.   Humalog 1:12, ISF    Lantus 15 qhs.      FBS--120  Lunches--120's  Supper-120's.   Bt 120's.        Diabetes Flow Sheet:  Diabetes Medications:  Lantus 15, Humalog  1:12, ISF 1:50   Diabetes Complications:--testicular neuropathy --started with lantus/levemir   Aspirin:   Statin: none  ACE/ARB:none   Last Urine Microalbumin:   Last Eye exam:  Last Diabetic Education:            Review of Systems   Constitutional: Negative for activity change and fatigue.   HENT: Negative for hearing loss and trouble swallowing.    Eyes: Negative for photophobia and visual disturbance.        Last Eye Exam:    Respiratory: Negative for cough and shortness of breath.    Cardiovascular: Negative for chest pain and palpitations.   Gastrointestinal: Negative for constipation and diarrhea.    Genitourinary: Negative for frequency and urgency.   Musculoskeletal: Negative for arthralgias and myalgias.   Skin: Negative for rash and wound.   Neurological: Negative for weakness and numbness.   Psychiatric/Behavioral: Negative for sleep disturbance. The patient is not nervous/anxious.        Objective:   Physical Exam   Constitutional: He is oriented to person, place, and time. He appears well-developed and well-nourished. No distress.   Appears younger than age, appropriately groomed.    HENT:   Head: Normocephalic and atraumatic.   Nose: Nose normal.   Mouth/Throat: Oropharynx is clear and moist.   Eyes: Pupils are equal, round, and reactive to light. Conjunctivae and EOM are normal.   Neck: Normal range of motion. Neck supple. No tracheal deviation present. No thyromegaly present.   Cardiovascular: Normal rate, regular rhythm, normal heart sounds and intact distal pulses. Exam reveals no friction rub.   No murmur heard.  Pulmonary/Chest: Effort normal and breath sounds normal. No respiratory distress. He has no wheezes.   Abdominal: Soft. Bowel sounds are normal. He exhibits no distension. There is no tenderness.   Musculoskeletal: Normal range of motion. He exhibits no edema.   deferred  Feet: no open wounds or calluses.  Good pedal care.   Vibratory sensation to feet intact bilaterally   Lymphadenopathy:     He has no cervical adenopathy.   Neurological: He is alert and oriented to person, place, and time. He has normal reflexes. No cranial nerve deficit. He exhibits normal muscle tone. Coordination normal.   Skin: Skin is warm and dry. No rash noted. He is not diaphoretic. No erythema.   Psychiatric: He has a normal mood and affect. His behavior is normal. Judgment and thought content normal.       Lab Review:   Hemoglobin A1C   Date Value Ref Range Status   07/30/2019 6.9 (H) 4.0 - 5.6 % Final     Comment:     ADA Screening Guidelines:  5.7-6.4%  Consistent with prediabetes  >or=6.5%  Consistent with  diabetes  High levels of fetal hemoglobin interfere with the HbA1C  assay. Heterozygous hemoglobin variants (HbS, HgC, etc)do  not significantly interfere with this assay.   However, presence of multiple variants may affect accuracy.     04/22/2019 6.1 (H) 4.0 - 5.6 % Final     Comment:     ADA Screening Guidelines:  5.7-6.4%  Consistent with prediabetes  >or=6.5%  Consistent with diabetes  High levels of fetal hemoglobin interfere with the HbA1C  assay. Heterozygous hemoglobin variants (HbS, HgC, etc)do  not significantly interfere with this assay.   However, presence of multiple variants may affect accuracy.     07/17/2018 6.6 (H) 4.0 - 5.6 % Final     Comment:     ADA Screening Guidelines:  5.7-6.4%  Consistent with prediabetes  >or=6.5%  Consistent with diabetes  High levels of fetal hemoglobin interfere with the HbA1C  assay. Heterozygous hemoglobin variants (HbS, HgC, etc)do  not significantly interfere with this assay.   However, presence of multiple variants may affect accuracy.         Chemistry        Component Value Date/Time     07/30/2019 0747    K 3.9 07/30/2019 0747     07/30/2019 0747    CO2 27 07/30/2019 0747    BUN 16 07/30/2019 0747    CREATININE 1.2 07/30/2019 0747     (H) 07/30/2019 0747        Component Value Date/Time    CALCIUM 9.4 07/30/2019 0747    ALKPHOS 60 07/30/2019 0747    AST 24 07/30/2019 0747    ALT 23 07/30/2019 0747    BILITOT 0.6 07/30/2019 0747        Lab Results   Component Value Date    LDLCALC 83.2 07/30/2019     Lab Results   Component Value Date    TSH 3.289 07/30/2019     Component      Latest Ref Rng & Units 7/30/2019   Cholesterol      120 - 199 mg/dL 177   Triglycerides      30 - 150 mg/dL 69   HDL      40 - 75 mg/dL 80 (H)   LDL Cholesterol External      63.0 - 159.0 mg/dL 83.2   Hdl/Cholesterol Ratio      20.0 - 50.0 % 45.2   Total Cholesterol/HDL Ratio      2.0 - 5.0 2.2   Non-HDL Cholesterol      mg/dL 97     Component      Latest Ref Rng & Units  7/30/2019   Microalbum.,U,Random      ug/mL 15.0   Creatinine, Random Ur      23.0 - 375.0 mg/dL 169.0   MICROALB/CREAT RATIO      0.0 - 30.0 ug/mg 8.9       Assessment:         1. Type 1 diabetes mellitus without complication  Chronic-worsened see plan    2. Essential hypertension  Chronic-stable -cont lisinopril 20/12.5    3. Polyneuropathy associated with underlying disease  crhonic-stable-monitor foot care    4. Hypoglycemia associated with diabetes  Acute-high risk-see plan          Plan:   Cont lantus 15,  Novolog CHO 1:12, and ISF 1:50   Suspect some age related/activity change in patterns and CHO and ISF may need to be adjusted.   [employer] starter kid provided.  If pt likes will call in rx for sensors.   Needs statin per recs       ORDERS 10/17/2019   F/u [place] break--put on list once my schedule is finalized--a1c prior

## 2019-10-17 NOTE — TELEPHONE ENCOUNTER
----- Message from Tosha Tellez sent at 10/17/2019 10:26 AM CDT -----  Contact: Rajesh  Type: Needs Medical Advice    Who Called:  patient  Best Call Back Number: [phone]  Additional Information: he was just in to see Kelley & is requesting a call back as he just tried to use his sensor & was given sensor error--patient has tried 3 times for it to function & is still getting error messages--placed call to POD 4 times with no answer--please advise--thank you

## 2019-10-21 ENCOUNTER — PATIENT MESSAGE (OUTPATIENT)
Dept: ENDOCRINOLOGY | Facility: CLINIC | Age: 44
End: 2019-10-21

## 2019-11-06 RX ORDER — SILDENAFIL CITRATE 20 MG/1
TABLET ORAL
Qty: 30 TABLET | Refills: 11 | Status: SHIPPED | OUTPATIENT
Start: 2019-11-06 | End: 2020-11-25 | Stop reason: SDUPTHER

## 2019-12-27 ENCOUNTER — PATIENT MESSAGE (OUTPATIENT)
Dept: ENDOCRINOLOGY | Facility: CLINIC | Age: 44
End: 2019-12-27

## 2019-12-30 ENCOUNTER — LAB VISIT (OUTPATIENT)
Dept: LAB | Facility: HOSPITAL | Age: 44
End: 2019-12-30
Payer: COMMERCIAL

## 2019-12-30 DIAGNOSIS — E10.9 TYPE 1 DIABETES MELLITUS WITHOUT COMPLICATION: ICD-10-CM

## 2019-12-30 LAB
ESTIMATED AVG GLUCOSE: 137 MG/DL (ref 68–131)
HBA1C MFR BLD HPLC: 6.4 % (ref 4–5.6)

## 2019-12-30 PROCEDURE — 36415 COLL VENOUS BLD VENIPUNCTURE: CPT | Mod: PO

## 2019-12-30 PROCEDURE — 83036 HEMOGLOBIN GLYCOSYLATED A1C: CPT

## 2020-01-17 ENCOUNTER — PATIENT MESSAGE (OUTPATIENT)
Dept: ENDOCRINOLOGY | Facility: CLINIC | Age: 45
End: 2020-01-17

## 2020-01-22 ENCOUNTER — OFFICE VISIT (OUTPATIENT)
Dept: ENDOCRINOLOGY | Facility: CLINIC | Age: 45
End: 2020-01-22
Payer: COMMERCIAL

## 2020-01-22 VITALS
WEIGHT: 213.94 LBS | HEART RATE: 78 BPM | HEIGHT: 72 IN | BODY MASS INDEX: 28.98 KG/M2 | SYSTOLIC BLOOD PRESSURE: 122 MMHG | DIASTOLIC BLOOD PRESSURE: 74 MMHG

## 2020-01-22 DIAGNOSIS — E10.9 TYPE 1 DIABETES MELLITUS WITHOUT COMPLICATION: Primary | ICD-10-CM

## 2020-01-22 DIAGNOSIS — I10 ESSENTIAL HYPERTENSION: ICD-10-CM

## 2020-01-22 PROCEDURE — 99999 PR PBB SHADOW E&M-EST. PATIENT-LVL III: CPT | Mod: PBBFAC,,, | Performed by: NURSE PRACTITIONER

## 2020-01-22 PROCEDURE — 99213 OFFICE O/P EST LOW 20 MIN: CPT | Mod: S$GLB,,, | Performed by: NURSE PRACTITIONER

## 2020-01-22 PROCEDURE — 99999 PR PBB SHADOW E&M-EST. PATIENT-LVL III: ICD-10-PCS | Mod: PBBFAC,,, | Performed by: NURSE PRACTITIONER

## 2020-01-22 PROCEDURE — 99213 PR OFFICE/OUTPT VISIT, EST, LEVL III, 20-29 MIN: ICD-10-PCS | Mod: S$GLB,,, | Performed by: NURSE PRACTITIONER

## 2020-01-22 RX ORDER — INSULIN ASPART 100 [IU]/ML
INJECTION, SOLUTION INTRAVENOUS; SUBCUTANEOUS
Qty: 1 BOX | Refills: 12 | Status: SHIPPED | OUTPATIENT
Start: 2020-01-22 | End: 2021-06-29 | Stop reason: ALTCHOICE

## 2020-01-22 RX ORDER — INSULIN DEGLUDEC 100 U/ML
30 INJECTION, SOLUTION SUBCUTANEOUS NIGHTLY
Qty: 15 ML | Refills: 12 | Status: SHIPPED | OUTPATIENT
Start: 2020-01-22 | End: 2021-01-29

## 2020-01-22 NOTE — PROGRESS NOTES
Subjective:      Patient ID: Rajesh Donovan is a 44 y.o. male.    Chief Complaint:  Routine DM f/u     History of Present Illness  Pt is a 44 y.o. WM  with a diagnosis of Type 1 diabetes mellitus diagnosed approximately  2013, as well as chronic conditions pending review including ED.  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 He did have a pump for about a year and hated it.     Interim Events: No acute event. No focal complaints. No glucose logs.  Denies any significant hypoglycemia.     Checking glucoses 5 times a day.   Humalog 1:12, ISF    Lantus 15 qhs.        Glucoses do dip around 3 am to 70's usually on nights after physical activity.   FBS--120  Lunches--120's  Supper-120's.   Bt 120's.        Diabetes Flow Sheet:  Diabetes Medications:  Lantus 15, Humalog  1:12, ISF 1:50   Diabetes Complications:--testicular neuropathy --started with lantus/levemir   Aspirin:   Statin: none  ACE/ARB:none   Last Urine Microalbumin:   Last Eye exam:  Last Diabetic Education:            Review of Systems   Constitutional: Negative for activity change and fatigue.   HENT: Negative for hearing loss and trouble swallowing.    Eyes: Negative for photophobia and visual disturbance.        Last Eye Exam:    Respiratory: Negative for cough and shortness of breath.    Cardiovascular: Negative for chest pain and palpitations.   Gastrointestinal: Negative for constipation and diarrhea.   Genitourinary: Negative for frequency and urgency.   Musculoskeletal: Negative for arthralgias and myalgias.   Skin: Negative for rash and wound.   Neurological: Negative for weakness and numbness.   Psychiatric/Behavioral: Negative for sleep disturbance. The patient is not nervous/anxious.        Objective:   Physical Exam   Constitutional: He is oriented to person, place, and time. He appears well-developed and well-nourished. No distress.    Appears younger than age, appropriately groomed.    HENT:   Head: Normocephalic and atraumatic.   Nose: Nose normal.   Mouth/Throat: Oropharynx is clear and moist.   Eyes: Pupils are equal, round, and reactive to light. Conjunctivae and EOM are normal.   Neck: Normal range of motion. Neck supple. No tracheal deviation present. No thyromegaly present.   Cardiovascular: Normal rate, regular rhythm, normal heart sounds and intact distal pulses. Exam reveals no friction rub.   No murmur heard.  Pulmonary/Chest: Effort normal and breath sounds normal. No respiratory distress. He has no wheezes.   Abdominal: Soft. Bowel sounds are normal. He exhibits no distension. There is no tenderness.   Musculoskeletal: Normal range of motion. He exhibits no edema.   Feet: no open wounds or calluses.  Good pedal care.   Vibratory sensation to feet intact bilaterally   Lymphadenopathy:     He has no cervical adenopathy.   Neurological: He is alert and oriented to person, place, and time. He has normal reflexes. No cranial nerve deficit. He exhibits normal muscle tone. Coordination normal.   Skin: Skin is warm and dry. No rash noted. He is not diaphoretic. No erythema.   Psychiatric: He has a normal mood and affect. His behavior is normal. Judgment and thought content normal.       Lab Review:   Hemoglobin A1C   Date Value Ref Range Status   12/30/2019 6.4 (H) 4.0 - 5.6 % Final     Comment:     ADA Screening Guidelines:  5.7-6.4%  Consistent with prediabetes  >or=6.5%  Consistent with diabetes  High levels of fetal hemoglobin interfere with the HbA1C  assay. Heterozygous hemoglobin variants (HbS, HgC, etc)do  not significantly interfere with this assay.   However, presence of multiple variants may affect accuracy.     07/30/2019 6.9 (H) 4.0 - 5.6 % Final     Comment:     ADA Screening Guidelines:  5.7-6.4%  Consistent with prediabetes  >or=6.5%  Consistent with diabetes  High levels of fetal hemoglobin interfere with the HbA1C  assay.  Heterozygous hemoglobin variants (HbS, HgC, etc)do  not significantly interfere with this assay.   However, presence of multiple variants may affect accuracy.     04/22/2019 6.1 (H) 4.0 - 5.6 % Final     Comment:     ADA Screening Guidelines:  5.7-6.4%  Consistent with prediabetes  >or=6.5%  Consistent with diabetes  High levels of fetal hemoglobin interfere with the HbA1C  assay. Heterozygous hemoglobin variants (HbS, HgC, etc)do  not significantly interfere with this assay.   However, presence of multiple variants may affect accuracy.         Chemistry        Component Value Date/Time     07/30/2019 0747    K 3.9 07/30/2019 0747     07/30/2019 0747    CO2 27 07/30/2019 0747    BUN 16 07/30/2019 0747    CREATININE 1.2 07/30/2019 0747     (H) 07/30/2019 0747        Component Value Date/Time    CALCIUM 9.4 07/30/2019 0747    ALKPHOS 60 07/30/2019 0747    AST 24 07/30/2019 0747    ALT 23 07/30/2019 0747    BILITOT 0.6 07/30/2019 0747        Lab Results   Component Value Date    LDLCALC 83.2 07/30/2019     Lab Results   Component Value Date    TSH 3.289 07/30/2019     Component      Latest Ref Rng & Units 7/30/2019   Cholesterol      120 - 199 mg/dL 177   Triglycerides      30 - 150 mg/dL 69   HDL      40 - 75 mg/dL 80 (H)   LDL Cholesterol External      63.0 - 159.0 mg/dL 83.2   Hdl/Cholesterol Ratio      20.0 - 50.0 % 45.2   Total Cholesterol/HDL Ratio      2.0 - 5.0 2.2   Non-HDL Cholesterol      mg/dL 97     Component      Latest Ref Rng & Units 7/30/2019   Microalbum.,U,Random      ug/mL 15.0   Creatinine, Random Ur      23.0 - 375.0 mg/dL 169.0   MICROALB/CREAT RATIO      0.0 - 30.0 ug/mg 8.9       Assessment:         1. Type 1 diabetes mellitus without complication  Chronic-worsened see plan    2. Essential hypertension  Chronic-stable -cont lisinopril 20/12.5    3. Polyneuropathy associated with underlying disease  crhonic-stable-monitor foot care    4. Hypoglycemia associated with diabetes   Acute-high risk-see plan      1. Type 1 diabetes mellitus without complication  insulin degludec (TRESIBA FLEXTOUCH U-100) 100 unit/mL (3 mL) InPn    insulin aspart U-100 (NOVOLOG FLEXPEN U-100 INSULIN) 100 unit/mL (3 mL) InPn pen    Comprehensive metabolic panel    Hemoglobin A1c    Lipid panel    Microalbumin/creatinine urine ratio   2. Essential hypertension             Plan:   Cont lantus 15,  HUmalog  CHO 1:12, and ISF 1:50     Will try Tresiba and Novolog --may be lower tier on formulary--rx sent in   Suspect some age related/activity change in patterns and CHO and ISF may need to be adjusted.   [employer] starter kid provided.  If pt likes will call in rx for sensors.   Needs statin per recs       ORDERS 01/22/2020     9 mo with fasting cmp, lipids, a1c, urine m/c

## 2020-01-23 ENCOUNTER — PATIENT MESSAGE (OUTPATIENT)
Dept: ENDOCRINOLOGY | Facility: CLINIC | Age: 45
End: 2020-01-23

## 2020-01-23 DIAGNOSIS — E10.9 TYPE 1 DIABETES MELLITUS WITHOUT COMPLICATION: ICD-10-CM

## 2020-01-23 RX ORDER — INSULIN LISPRO 100 [IU]/ML
INJECTION, SOLUTION INTRAVENOUS; SUBCUTANEOUS
Qty: 15 ML | Refills: 11 | Status: SHIPPED | OUTPATIENT
Start: 2020-01-23 | End: 2021-10-25

## 2020-01-24 ENCOUNTER — PATIENT MESSAGE (OUTPATIENT)
Dept: ENDOCRINOLOGY | Facility: CLINIC | Age: 45
End: 2020-01-24

## 2020-02-04 ENCOUNTER — PATIENT MESSAGE (OUTPATIENT)
Dept: ENDOCRINOLOGY | Facility: CLINIC | Age: 45
End: 2020-02-04

## 2020-02-05 ENCOUNTER — PATIENT MESSAGE (OUTPATIENT)
Dept: ENDOCRINOLOGY | Facility: CLINIC | Age: 45
End: 2020-02-05

## 2020-04-03 DIAGNOSIS — E10.9 TYPE 1 DIABETES MELLITUS WITHOUT COMPLICATION: ICD-10-CM

## 2020-08-17 ENCOUNTER — PATIENT MESSAGE (OUTPATIENT)
Dept: ENDOCRINOLOGY | Facility: CLINIC | Age: 45
End: 2020-08-17

## 2020-08-18 ENCOUNTER — OFFICE VISIT (OUTPATIENT)
Dept: ENDOCRINOLOGY | Facility: CLINIC | Age: 45
End: 2020-08-18
Payer: COMMERCIAL

## 2020-08-18 DIAGNOSIS — E10.9 TYPE 1 DIABETES MELLITUS WITHOUT COMPLICATION: Primary | ICD-10-CM

## 2020-08-18 DIAGNOSIS — I10 ESSENTIAL HYPERTENSION: ICD-10-CM

## 2020-08-18 PROCEDURE — 99213 OFFICE O/P EST LOW 20 MIN: CPT | Mod: 95,,, | Performed by: NURSE PRACTITIONER

## 2020-08-18 PROCEDURE — 99213 PR OFFICE/OUTPT VISIT, EST, LEVL III, 20-29 MIN: ICD-10-PCS | Mod: 95,,, | Performed by: NURSE PRACTITIONER

## 2020-08-18 NOTE — PROGRESS NOTES
Subjective:      Patient ID: Rajesh Donovan is a 44 y.o. male.    Chief Complaint:  Routine DM f/u   The patient location is: home   The chief complaint leading to consultation is: routine DM f/u     Visit type: audiovisual    Face to Face time with patient: 2:07 to 2:20=13 min     minutes of total time spent on the encounter, which includes face to face time and non-face to face time preparing to see the patient (eg, review of tests), Obtaining and/or reviewing separately obtained history, Documenting clinical information in the electronic or other health record, Independently interpreting results (not separately reported) and communicating results to the patient/family/caregiver, or Care coordination (not separately reported).     Each patient to whom he or she provides medical services by telemedicine is:  (1) informed of the relationship between the physician and patient and the respective role of any other health care provider with respect to management of the patient; and (2) notified that he or she may decline to receive medical services by telemedicine and may withdraw from such care at any time.    Notes: r/t Covid    .  History of Present Illness  Pt is a 44 y.o. WM  with a diagnosis of Type 1 diabetes mellitus diagnosed approximately  2013, as well as chronic conditions pending review including ED.  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 He did have a pump for about a year and hated it.     Interim Events: No acute event. No focal complaints. No glucose logs.  Denies any significant hypoglycemia. ROS negative.   Put on 19 lbs.   Now working out 7 days a week.   Some Yoga,   SOme burpees.   Different aerobics. Some 5 K, 10 K.      .   Checking glucoses 5 times a day.   Humalog 1:12, ISF    Lantus 15 qhs.        Diabetes Flow Sheet:  Diabetes Medications:  Lantus 15, Humalog  1:12, ISF 1:50    Diabetes Complications:--testicular neuropathy --started with lantus/levemir   Aspirin:   Statin: none  ACE/ARB:none   Last Urine Microalbumin:   Last Eye exam:  Last Diabetic Education:            Review of Systems   Constitutional: Negative for activity change and fatigue.   HENT: Negative for hearing loss and trouble swallowing.    Eyes: Negative for photophobia and visual disturbance.        Last Eye Exam:    Respiratory: Negative for cough and shortness of breath.    Cardiovascular: Negative for chest pain and palpitations.   Gastrointestinal: Negative for constipation and diarrhea.   Genitourinary: Negative for frequency and urgency.   Musculoskeletal: Negative for arthralgias and myalgias.   Skin: Negative for rash and wound.   Neurological: Negative for weakness and numbness.   Psychiatric/Behavioral: Negative for sleep disturbance. The patient is not nervous/anxious.        Objective:   Physical Exam   Constitutional: He is oriented to person, place, and time. He appears well-developed and well-nourished.   HENT:   Head: Normocephalic and atraumatic.   Neurological: He is alert and oriented to person, place, and time.   Psychiatric: He has a normal mood and affect. His behavior is normal. Judgment and thought content normal.       Lab Review:   Hemoglobin A1C   Date Value Ref Range Status   12/30/2019 6.4 (H) 4.0 - 5.6 % Final     Comment:     ADA Screening Guidelines:  5.7-6.4%  Consistent with prediabetes  >or=6.5%  Consistent with diabetes  High levels of fetal hemoglobin interfere with the HbA1C  assay. Heterozygous hemoglobin variants (HbS, HgC, etc)do  not significantly interfere with this assay.   However, presence of multiple variants may affect accuracy.     07/30/2019 6.9 (H) 4.0 - 5.6 % Final     Comment:     ADA Screening Guidelines:  5.7-6.4%  Consistent with prediabetes  >or=6.5%  Consistent with diabetes  High levels of fetal hemoglobin interfere with the HbA1C  assay. Heterozygous  hemoglobin variants (HbS, HgC, etc)do  not significantly interfere with this assay.   However, presence of multiple variants may affect accuracy.     04/22/2019 6.1 (H) 4.0 - 5.6 % Final     Comment:     ADA Screening Guidelines:  5.7-6.4%  Consistent with prediabetes  >or=6.5%  Consistent with diabetes  High levels of fetal hemoglobin interfere with the HbA1C  assay. Heterozygous hemoglobin variants (HbS, HgC, etc)do  not significantly interfere with this assay.   However, presence of multiple variants may affect accuracy.         Chemistry        Component Value Date/Time     07/30/2019 0747    K 3.9 07/30/2019 0747     07/30/2019 0747    CO2 27 07/30/2019 0747    BUN 16 07/30/2019 0747    CREATININE 1.2 07/30/2019 0747     (H) 07/30/2019 0747        Component Value Date/Time    CALCIUM 9.4 07/30/2019 0747    ALKPHOS 60 07/30/2019 0747    AST 24 07/30/2019 0747    ALT 23 07/30/2019 0747    BILITOT 0.6 07/30/2019 0747        Lab Results   Component Value Date    LDLCALC 83.2 07/30/2019     Lab Results   Component Value Date    TSH 3.289 07/30/2019     Component      Latest Ref Rng & Units 7/30/2019   Cholesterol      120 - 199 mg/dL 177   Triglycerides      30 - 150 mg/dL 69   HDL      40 - 75 mg/dL 80 (H)   LDL Cholesterol External      63.0 - 159.0 mg/dL 83.2   Hdl/Cholesterol Ratio      20.0 - 50.0 % 45.2   Total Cholesterol/HDL Ratio      2.0 - 5.0 2.2   Non-HDL Cholesterol      mg/dL 97     Component      Latest Ref Rng & Units 7/30/2019   Microalbum.,U,Random      ug/mL 15.0   Creatinine, Random Ur      23.0 - 375.0 mg/dL 169.0   MICROALB/CREAT RATIO      0.0 - 30.0 ug/mg 8.9       Assessment:       1. Type 1 diabetes mellitus without complication     2. Essential hypertension           Plan:   Cont Tresiba  15,  HUmalog  CHO 1:12, and ISF 1:50     Will try Tresiba and HUmalog  --may be lower tier on formulary--rx sent in     ORDERS 08/18/2020     6 mo with fasting cmp, lipids, a1c, TSH,  urine m/c

## 2020-08-23 ENCOUNTER — PATIENT MESSAGE (OUTPATIENT)
Dept: ENDOCRINOLOGY | Facility: CLINIC | Age: 45
End: 2020-08-23

## 2020-08-24 ENCOUNTER — PATIENT MESSAGE (OUTPATIENT)
Dept: ENDOCRINOLOGY | Facility: CLINIC | Age: 45
End: 2020-08-24

## 2020-08-25 ENCOUNTER — PATIENT MESSAGE (OUTPATIENT)
Dept: ENDOCRINOLOGY | Facility: CLINIC | Age: 45
End: 2020-08-25

## 2020-08-26 ENCOUNTER — PATIENT MESSAGE (OUTPATIENT)
Dept: ENDOCRINOLOGY | Facility: CLINIC | Age: 45
End: 2020-08-26

## 2020-08-27 ENCOUNTER — TELEPHONE (OUTPATIENT)
Dept: ENDOCRINOLOGY | Facility: CLINIC | Age: 45
End: 2020-08-27

## 2020-08-27 NOTE — TELEPHONE ENCOUNTER
----- Message from Jose Thibodeaux sent at 8/27/2020  9:38 AM CDT -----  Regarding: rx  Contact: pharmacist  Type:  Pharmacy Calling to Clarify an RX    Name of Caller:  Christopher  [facility]arm[facility]  Prescription Name:    What do they need to clarify?:    Best Call Back Number:  [phone]-case [id number]  Additional Information:  johny free style sensors, pt received an 80 day supply on 07/12/2020 and then try again on 08/18/2020.

## 2020-08-27 NOTE — TELEPHONE ENCOUNTER
----- Message from Antonio Sprague sent at 8/27/2020  1:23 PM CDT -----  Type:  Patient Returning Call    Who Called:  Christopher/ Brinda Conrad  Who Left Message for Patient:  Sha  Does the patient know what this is regarding?:  Rejection on Adarsh Cedillo  Best Call Back Number:  [phone]  Additional Information: Case # [id number]

## 2020-08-27 NOTE — TELEPHONE ENCOUNTER
Spoke to [facility] and they adv the pharm had run it incorrectly for wrong day supply and causing it to be cash. Spoke to pharm and adv to run this one time as qty of 2 per 28 days and it went through with 0 copay. Adv after this it will need to be run qty 6 per 84 days. Spoke to pt and adv of this.

## 2020-12-01 ENCOUNTER — PATIENT MESSAGE (OUTPATIENT)
Dept: ENDOCRINOLOGY | Facility: CLINIC | Age: 45
End: 2020-12-01

## 2020-12-01 RX ORDER — SILDENAFIL CITRATE 20 MG/1
TABLET ORAL
Qty: 30 TABLET | Refills: 11 | Status: SHIPPED | OUTPATIENT
Start: 2020-12-01 | End: 2020-12-29 | Stop reason: SDUPTHER

## 2020-12-24 ENCOUNTER — LAB VISIT (OUTPATIENT)
Dept: LAB | Facility: HOSPITAL | Age: 45
End: 2020-12-24
Attending: NURSE PRACTITIONER
Payer: COMMERCIAL

## 2020-12-24 DIAGNOSIS — E10.9 TYPE 1 DIABETES MELLITUS WITHOUT COMPLICATION: ICD-10-CM

## 2020-12-24 LAB
ALBUMIN SERPL BCP-MCNC: 4.2 G/DL (ref 3.5–5.2)
ALP SERPL-CCNC: 59 U/L (ref 55–135)
ALT SERPL W/O P-5'-P-CCNC: 18 U/L (ref 10–44)
ANION GAP SERPL CALC-SCNC: 10 MMOL/L (ref 8–16)
AST SERPL-CCNC: 20 U/L (ref 10–40)
BILIRUB SERPL-MCNC: 0.6 MG/DL (ref 0.1–1)
BUN SERPL-MCNC: 21 MG/DL (ref 6–20)
CALCIUM SERPL-MCNC: 9.6 MG/DL (ref 8.7–10.5)
CHLORIDE SERPL-SCNC: 103 MMOL/L (ref 95–110)
CHOLEST SERPL-MCNC: 174 MG/DL (ref 120–199)
CHOLEST/HDLC SERPL: 3.6 {RATIO} (ref 2–5)
CO2 SERPL-SCNC: 29 MMOL/L (ref 23–29)
CREAT SERPL-MCNC: 1.1 MG/DL (ref 0.5–1.4)
EST. GFR  (AFRICAN AMERICAN): >60 ML/MIN/1.73 M^2
EST. GFR  (NON AFRICAN AMERICAN): >60 ML/MIN/1.73 M^2
ESTIMATED AVG GLUCOSE: 148 MG/DL (ref 68–131)
GLUCOSE SERPL-MCNC: 133 MG/DL (ref 70–110)
HBA1C MFR BLD HPLC: 6.8 % (ref 4–5.6)
HDLC SERPL-MCNC: 49 MG/DL (ref 40–75)
HDLC SERPL: 28.2 % (ref 20–50)
LDLC SERPL CALC-MCNC: 110.4 MG/DL (ref 63–159)
NONHDLC SERPL-MCNC: 125 MG/DL
POTASSIUM SERPL-SCNC: 4.3 MMOL/L (ref 3.5–5.1)
PROT SERPL-MCNC: 7.4 G/DL (ref 6–8.4)
SODIUM SERPL-SCNC: 142 MMOL/L (ref 136–145)
TRIGL SERPL-MCNC: 73 MG/DL (ref 30–150)
TSH SERPL DL<=0.005 MIU/L-ACNC: 0.93 UIU/ML (ref 0.4–4)

## 2020-12-24 PROCEDURE — 36415 COLL VENOUS BLD VENIPUNCTURE: CPT | Mod: PO

## 2020-12-24 PROCEDURE — 83036 HEMOGLOBIN GLYCOSYLATED A1C: CPT

## 2020-12-24 PROCEDURE — 84443 ASSAY THYROID STIM HORMONE: CPT

## 2020-12-24 PROCEDURE — 80061 LIPID PANEL: CPT

## 2020-12-24 PROCEDURE — 80053 COMPREHEN METABOLIC PANEL: CPT

## 2020-12-29 ENCOUNTER — OFFICE VISIT (OUTPATIENT)
Dept: ENDOCRINOLOGY | Facility: CLINIC | Age: 45
End: 2020-12-29
Payer: COMMERCIAL

## 2020-12-29 ENCOUNTER — PATIENT MESSAGE (OUTPATIENT)
Dept: ENDOCRINOLOGY | Facility: CLINIC | Age: 45
End: 2020-12-29

## 2020-12-29 VITALS
BODY MASS INDEX: 28.62 KG/M2 | WEIGHT: 211.31 LBS | HEIGHT: 72 IN | DIASTOLIC BLOOD PRESSURE: 72 MMHG | HEART RATE: 60 BPM | SYSTOLIC BLOOD PRESSURE: 108 MMHG

## 2020-12-29 DIAGNOSIS — E11.649 HYPOGLYCEMIA ASSOCIATED WITH DIABETES: ICD-10-CM

## 2020-12-29 DIAGNOSIS — G63 POLYNEUROPATHY ASSOCIATED WITH UNDERLYING DISEASE: ICD-10-CM

## 2020-12-29 DIAGNOSIS — E10.9 TYPE 1 DIABETES MELLITUS WITHOUT COMPLICATION: ICD-10-CM

## 2020-12-29 DIAGNOSIS — E10.9 TYPE 1 DIABETES MELLITUS WITHOUT COMPLICATION: Primary | ICD-10-CM

## 2020-12-29 DIAGNOSIS — I10 ESSENTIAL HYPERTENSION: ICD-10-CM

## 2020-12-29 PROCEDURE — 99999 PR PBB SHADOW E&M-EST. PATIENT-LVL V: CPT | Mod: PBBFAC,,, | Performed by: NURSE PRACTITIONER

## 2020-12-29 PROCEDURE — 99999 PR PBB SHADOW E&M-EST. PATIENT-LVL V: ICD-10-PCS | Mod: PBBFAC,,, | Performed by: NURSE PRACTITIONER

## 2020-12-29 PROCEDURE — 95251 PR GLUCOSE MONITOR, 72 HOUR, PHYS INTERP: ICD-10-PCS | Mod: S$GLB,,, | Performed by: NURSE PRACTITIONER

## 2020-12-29 PROCEDURE — 99214 PR OFFICE/OUTPT VISIT, EST, LEVL IV, 30-39 MIN: ICD-10-PCS | Mod: S$GLB,,, | Performed by: NURSE PRACTITIONER

## 2020-12-29 PROCEDURE — 3008F BODY MASS INDEX DOCD: CPT | Mod: CPTII,S$GLB,, | Performed by: NURSE PRACTITIONER

## 2020-12-29 PROCEDURE — 1126F AMNT PAIN NOTED NONE PRSNT: CPT | Mod: S$GLB,,, | Performed by: NURSE PRACTITIONER

## 2020-12-29 PROCEDURE — 1126F PR PAIN SEVERITY QUANTIFIED, NO PAIN PRESENT: ICD-10-PCS | Mod: S$GLB,,, | Performed by: NURSE PRACTITIONER

## 2020-12-29 PROCEDURE — 95251 CONT GLUC MNTR ANALYSIS I&R: CPT | Mod: S$GLB,,, | Performed by: NURSE PRACTITIONER

## 2020-12-29 PROCEDURE — 99214 OFFICE O/P EST MOD 30 MIN: CPT | Mod: S$GLB,,, | Performed by: NURSE PRACTITIONER

## 2020-12-29 PROCEDURE — 3008F PR BODY MASS INDEX (BMI) DOCUMENTED: ICD-10-PCS | Mod: CPTII,S$GLB,, | Performed by: NURSE PRACTITIONER

## 2020-12-29 RX ORDER — GLUCAGON 3 MG/1
3 POWDER NASAL
Qty: 2 EACH | Status: SHIPPED | OUTPATIENT
Start: 2020-12-29 | End: 2022-02-28

## 2020-12-29 RX ORDER — SILDENAFIL CITRATE 20 MG/1
TABLET ORAL
Qty: 30 TABLET | Refills: 11 | Status: SHIPPED | OUTPATIENT
Start: 2020-12-29 | End: 2020-12-30 | Stop reason: SDUPTHER

## 2020-12-29 NOTE — PATIENT INSTRUCTIONS
Decrease Tresiba  15,  To 12 units:      HUmalog  CHO 1:12, and ISF 1:50     On work out days, consider decreasing ISF from 1:50 to 1:75

## 2020-12-29 NOTE — PROGRESS NOTES
Subjective:      Patient ID: Rajesh Donovan is a 45 y.o. male.    Chief Complaint:  Routine DM f/u   .  History of Present Illness  Pt is a 45 y.o. WM  with a diagnosis of Type 1 diabetes mellitus diagnosed approximately  2013, as well as chronic conditions pending review including ED.  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 He did have a pump for about a year and hated it.     Interim Events: No acute event. No focal complaints. WOrking out a lot--trying to lose weight. Nguyen sensor downloaed. A lot of hypoglycemia, including overnight.  States glucoses rise real high after exercising, and then will plummet later, or possibly he may correct, and then plummet, and then eat further perpetuating the cycle. .Sensor downloaded and reviewed.     Sensor Interpretation  5% Very high  18% High  59% Time in range  9% Low  9% Very Low    Prominent Theme/Finding: Nocturnal hypoglycemia, as well as intermittently during the day.           Now working out 7 days a week.   Some Yoga,   SOme burpees.   Different aerobics. Some 5 K, 10 K.      .   Checking glucoses 5 times a day.   Humalog 1:12, ISF    Lantus 15 qhs.        Diabetes Flow Sheet:  Diabetes Medications:  Lantus 15, Humalog  1:12, ISF 1:50   Diabetes Complications:--testicular neuropathy --started with lantus/levemir   Aspirin:   Statin: none  ACE/ARB:none   Last Urine Microalbumin:   Last Eye exam:  Last Diabetic Education:            Review of Systems   Constitutional: Negative for activity change and fatigue.   HENT: Negative for hearing loss and trouble swallowing.    Eyes: Negative for photophobia and visual disturbance.        Last Eye Exam:    Respiratory: Negative for cough and shortness of breath.    Cardiovascular: Negative for chest pain and palpitations.   Gastrointestinal: Negative for constipation and diarrhea.   Genitourinary: Negative for  frequency and urgency.   Musculoskeletal: Negative for arthralgias and myalgias.   Skin: Negative for rash and wound.   Neurological: Negative for weakness and numbness.   Psychiatric/Behavioral: Negative for sleep disturbance. The patient is not nervous/anxious.        Objective:   Physical Exam   Constitutional: He is oriented to person, place, and time. He appears well-developed and well-nourished.   HENT:   Head: Normocephalic and atraumatic.   Eyes: Pupils are equal, round, and reactive to light. Conjunctivae and EOM are normal.   Neck: Normal range of motion. Neck supple. No tracheal deviation present. No thyromegaly present.   Cardiovascular: Normal rate, regular rhythm and normal heart sounds.   Pulmonary/Chest: Effort normal and breath sounds normal.   Musculoskeletal: Normal range of motion.         General: No deformity or edema.      Comments: Feet: no open wounds or calluses.  Good  Pedal care.   +2 pedal pulses.   Vibratory sensation to feet intact bilaterally.    Neurological: He is alert and oriented to person, place, and time. He has normal reflexes.   Skin: Skin is warm and dry.   Psychiatric: He has a normal mood and affect. His behavior is normal. Judgment and thought content normal.       Lab Review:   Hemoglobin A1C   Date Value Ref Range Status   12/24/2020 6.8 (H) 4.0 - 5.6 % Final     Comment:     ADA Screening Guidelines:  5.7-6.4%  Consistent with prediabetes  >or=6.5%  Consistent with diabetes  High levels of fetal hemoglobin interfere with the HbA1C  assay. Heterozygous hemoglobin variants (HbS, HgC, etc)do  not significantly interfere with this assay.   However, presence of multiple variants may affect accuracy.     12/30/2019 6.4 (H) 4.0 - 5.6 % Final     Comment:     ADA Screening Guidelines:  5.7-6.4%  Consistent with prediabetes  >or=6.5%  Consistent with diabetes  High levels of fetal hemoglobin interfere with the HbA1C  assay. Heterozygous hemoglobin variants (HbS, HgC,  etc)do  not significantly interfere with this assay.   However, presence of multiple variants may affect accuracy.     07/30/2019 6.9 (H) 4.0 - 5.6 % Final     Comment:     ADA Screening Guidelines:  5.7-6.4%  Consistent with prediabetes  >or=6.5%  Consistent with diabetes  High levels of fetal hemoglobin interfere with the HbA1C  assay. Heterozygous hemoglobin variants (HbS, HgC, etc)do  not significantly interfere with this assay.   However, presence of multiple variants may affect accuracy.         Chemistry        Component Value Date/Time     12/24/2020 0658    K 4.3 12/24/2020 0658     12/24/2020 0658    CO2 29 12/24/2020 0658    BUN 21 (H) 12/24/2020 0658    CREATININE 1.1 12/24/2020 0658     (H) 12/24/2020 0658        Component Value Date/Time    CALCIUM 9.6 12/24/2020 0658    ALKPHOS 59 12/24/2020 0658    AST 20 12/24/2020 0658    ALT 18 12/24/2020 0658    BILITOT 0.6 12/24/2020 0658        Lab Results   Component Value Date    LDLCALC 110.4 12/24/2020     Lab Results   Component Value Date    TSH 0.934 12/24/2020         Assessment:     1. Type 1 diabetes mellitus without complication  sildenafil (REVATIO) 20 mg Tab  Chronic-uncontrolled-see plan     glucagon (BAQSIMI) 3 mg/actuation Spry    Hemoglobin A1C   2. Essential hypertension  Chronic-stable -cont lisinpril 20 mg    3. Hypoglycemia associated with diabetes  Acute-high risk   4. Polyneuropathy associated with underlying disease  Chronic-stable-monitor foot care.         Plan:   Decrease Tresiba  15,  To 12 units:    HUmalog  CHO 1:12, and ISF 1:50    On work out days, consider decreasing ISF from 1:50 to 1:75     Pt to send me message next week to see if hypo improved.   And we should also be able to pull johny out of the cloud for me to review.     -revisit statin use.     ORDERS 12/29/2020     6 mo with  a1c prior  (lab on Saturday).

## 2020-12-30 ENCOUNTER — PATIENT MESSAGE (OUTPATIENT)
Dept: ENDOCRINOLOGY | Facility: CLINIC | Age: 45
End: 2020-12-30

## 2020-12-30 RX ORDER — SILDENAFIL CITRATE 20 MG/1
TABLET ORAL
Qty: 30 TABLET | Refills: 11 | Status: SHIPPED | OUTPATIENT
Start: 2020-12-30 | End: 2022-01-05

## 2021-02-22 ENCOUNTER — PATIENT MESSAGE (OUTPATIENT)
Dept: ENDOCRINOLOGY | Facility: CLINIC | Age: 46
End: 2021-02-22

## 2021-04-16 ENCOUNTER — TELEPHONE (OUTPATIENT)
Dept: GASTROENTEROLOGY | Facility: CLINIC | Age: 46
End: 2021-04-16

## 2021-06-04 ENCOUNTER — OFFICE VISIT (OUTPATIENT)
Dept: GASTROENTEROLOGY | Facility: CLINIC | Age: 46
End: 2021-06-04
Payer: COMMERCIAL

## 2021-06-04 VITALS — HEIGHT: 72 IN | WEIGHT: 216.25 LBS | BODY MASS INDEX: 29.29 KG/M2

## 2021-06-04 DIAGNOSIS — Z86.010 HISTORY OF COLON POLYPS: Primary | ICD-10-CM

## 2021-06-04 PROCEDURE — 1126F PR PAIN SEVERITY QUANTIFIED, NO PAIN PRESENT: ICD-10-PCS | Mod: S$GLB,,, | Performed by: INTERNAL MEDICINE

## 2021-06-04 PROCEDURE — 1126F AMNT PAIN NOTED NONE PRSNT: CPT | Mod: S$GLB,,, | Performed by: INTERNAL MEDICINE

## 2021-06-04 PROCEDURE — 99999 PR PBB SHADOW E&M-EST. PATIENT-LVL III: ICD-10-PCS | Mod: PBBFAC,,, | Performed by: INTERNAL MEDICINE

## 2021-06-04 PROCEDURE — 3008F BODY MASS INDEX DOCD: CPT | Mod: CPTII,S$GLB,, | Performed by: INTERNAL MEDICINE

## 2021-06-04 PROCEDURE — 3008F PR BODY MASS INDEX (BMI) DOCUMENTED: ICD-10-PCS | Mod: CPTII,S$GLB,, | Performed by: INTERNAL MEDICINE

## 2021-06-04 PROCEDURE — 99204 OFFICE O/P NEW MOD 45 MIN: CPT | Mod: S$GLB,,, | Performed by: INTERNAL MEDICINE

## 2021-06-04 PROCEDURE — 99999 PR PBB SHADOW E&M-EST. PATIENT-LVL III: CPT | Mod: PBBFAC,,, | Performed by: INTERNAL MEDICINE

## 2021-06-04 PROCEDURE — 99204 PR OFFICE/OUTPT VISIT, NEW, LEVL IV, 45-59 MIN: ICD-10-PCS | Mod: S$GLB,,, | Performed by: INTERNAL MEDICINE

## 2021-06-04 RX ORDER — MELOXICAM 15 MG/1
15 TABLET ORAL DAILY
COMMUNITY
Start: 2021-05-25

## 2021-06-22 ENCOUNTER — LAB VISIT (OUTPATIENT)
Dept: LAB | Facility: HOSPITAL | Age: 46
End: 2021-06-22
Attending: OPHTHALMOLOGY
Payer: COMMERCIAL

## 2021-06-22 DIAGNOSIS — E10.9 TYPE 1 DIABETES MELLITUS WITHOUT COMPLICATION: ICD-10-CM

## 2021-06-22 LAB
ALBUMIN SERPL BCP-MCNC: 4 G/DL (ref 3.5–5.2)
ALP SERPL-CCNC: 62 U/L (ref 55–135)
ALT SERPL W/O P-5'-P-CCNC: 18 U/L (ref 10–44)
ANION GAP SERPL CALC-SCNC: 10 MMOL/L (ref 8–16)
AST SERPL-CCNC: 24 U/L (ref 10–40)
BILIRUB SERPL-MCNC: 0.4 MG/DL (ref 0.1–1)
BUN SERPL-MCNC: 14 MG/DL (ref 6–20)
CALCIUM SERPL-MCNC: 9.6 MG/DL (ref 8.7–10.5)
CHLORIDE SERPL-SCNC: 106 MMOL/L (ref 95–110)
CHOLEST SERPL-MCNC: 166 MG/DL (ref 120–199)
CHOLEST/HDLC SERPL: 2.5 {RATIO} (ref 2–5)
CO2 SERPL-SCNC: 24 MMOL/L (ref 23–29)
CREAT SERPL-MCNC: 1 MG/DL (ref 0.5–1.4)
EST. GFR  (AFRICAN AMERICAN): >60 ML/MIN/1.73 M^2
EST. GFR  (NON AFRICAN AMERICAN): >60 ML/MIN/1.73 M^2
ESTIMATED AVG GLUCOSE: 128 MG/DL (ref 68–131)
GLUCOSE SERPL-MCNC: 174 MG/DL (ref 70–110)
HBA1C MFR BLD: 6.1 % (ref 4–5.6)
HDLC SERPL-MCNC: 66 MG/DL (ref 40–75)
HDLC SERPL: 39.8 % (ref 20–50)
LDLC SERPL CALC-MCNC: 88.4 MG/DL (ref 63–159)
NONHDLC SERPL-MCNC: 100 MG/DL
POTASSIUM SERPL-SCNC: 4.4 MMOL/L (ref 3.5–5.1)
PROT SERPL-MCNC: 7.1 G/DL (ref 6–8.4)
SODIUM SERPL-SCNC: 140 MMOL/L (ref 136–145)
TRIGL SERPL-MCNC: 58 MG/DL (ref 30–150)

## 2021-06-22 PROCEDURE — 83036 HEMOGLOBIN GLYCOSYLATED A1C: CPT | Performed by: NURSE PRACTITIONER

## 2021-06-22 PROCEDURE — 80053 COMPREHEN METABOLIC PANEL: CPT | Performed by: NURSE PRACTITIONER

## 2021-06-22 PROCEDURE — 36415 COLL VENOUS BLD VENIPUNCTURE: CPT | Mod: PO | Performed by: NURSE PRACTITIONER

## 2021-06-22 PROCEDURE — 80061 LIPID PANEL: CPT | Performed by: NURSE PRACTITIONER

## 2021-06-29 ENCOUNTER — OFFICE VISIT (OUTPATIENT)
Dept: ENDOCRINOLOGY | Facility: CLINIC | Age: 46
End: 2021-06-29
Payer: COMMERCIAL

## 2021-06-29 VITALS
WEIGHT: 211.19 LBS | HEART RATE: 80 BPM | HEIGHT: 72 IN | SYSTOLIC BLOOD PRESSURE: 114 MMHG | RESPIRATION RATE: 18 BRPM | DIASTOLIC BLOOD PRESSURE: 84 MMHG | BODY MASS INDEX: 28.6 KG/M2

## 2021-06-29 DIAGNOSIS — E10.9 TYPE 1 DIABETES MELLITUS WITHOUT COMPLICATION: Primary | ICD-10-CM

## 2021-06-29 DIAGNOSIS — I10 ESSENTIAL HYPERTENSION: ICD-10-CM

## 2021-06-29 DIAGNOSIS — E11.649 HYPOGLYCEMIA ASSOCIATED WITH DIABETES: ICD-10-CM

## 2021-06-29 PROCEDURE — 3008F PR BODY MASS INDEX (BMI) DOCUMENTED: ICD-10-PCS | Mod: CPTII,S$GLB,, | Performed by: NURSE PRACTITIONER

## 2021-06-29 PROCEDURE — 3008F BODY MASS INDEX DOCD: CPT | Mod: CPTII,S$GLB,, | Performed by: NURSE PRACTITIONER

## 2021-06-29 PROCEDURE — 1125F AMNT PAIN NOTED PAIN PRSNT: CPT | Mod: S$GLB,,, | Performed by: NURSE PRACTITIONER

## 2021-06-29 PROCEDURE — 95251 CONT GLUC MNTR ANALYSIS I&R: CPT | Mod: S$GLB,,, | Performed by: NURSE PRACTITIONER

## 2021-06-29 PROCEDURE — 3044F HG A1C LEVEL LT 7.0%: CPT | Mod: CPTII,S$GLB,, | Performed by: NURSE PRACTITIONER

## 2021-06-29 PROCEDURE — 99999 PR PBB SHADOW E&M-EST. PATIENT-LVL IV: CPT | Mod: PBBFAC,,, | Performed by: NURSE PRACTITIONER

## 2021-06-29 PROCEDURE — 95251 PR GLUCOSE MONITOR, 72 HOUR, PHYS INTERP: ICD-10-PCS | Mod: S$GLB,,, | Performed by: NURSE PRACTITIONER

## 2021-06-29 PROCEDURE — 99999 PR PBB SHADOW E&M-EST. PATIENT-LVL IV: ICD-10-PCS | Mod: PBBFAC,,, | Performed by: NURSE PRACTITIONER

## 2021-06-29 PROCEDURE — 1125F PR PAIN SEVERITY QUANTIFIED, PAIN PRESENT: ICD-10-PCS | Mod: S$GLB,,, | Performed by: NURSE PRACTITIONER

## 2021-06-29 PROCEDURE — 99214 PR OFFICE/OUTPT VISIT, EST, LEVL IV, 30-39 MIN: ICD-10-PCS | Mod: S$GLB,,, | Performed by: NURSE PRACTITIONER

## 2021-06-29 PROCEDURE — 99214 OFFICE O/P EST MOD 30 MIN: CPT | Mod: S$GLB,,, | Performed by: NURSE PRACTITIONER

## 2021-06-29 PROCEDURE — 3044F PR MOST RECENT HEMOGLOBIN A1C LEVEL <7.0%: ICD-10-PCS | Mod: CPTII,S$GLB,, | Performed by: NURSE PRACTITIONER

## 2021-07-08 ENCOUNTER — TELEPHONE (OUTPATIENT)
Dept: ENDOSCOPY | Facility: HOSPITAL | Age: 46
End: 2021-07-08

## 2021-07-08 ENCOUNTER — TELEPHONE (OUTPATIENT)
Dept: GASTROENTEROLOGY | Facility: CLINIC | Age: 46
End: 2021-07-08

## 2021-07-09 PROBLEM — Z86.0100 HISTORY OF COLON POLYPS: Status: ACTIVE | Noted: 2021-07-09

## 2021-07-09 PROBLEM — Z86.010 HISTORY OF COLON POLYPS: Status: ACTIVE | Noted: 2021-07-09

## 2021-11-22 ENCOUNTER — PATIENT MESSAGE (OUTPATIENT)
Dept: OPTOMETRY | Facility: CLINIC | Age: 46
End: 2021-11-22
Payer: COMMERCIAL

## 2021-11-22 ENCOUNTER — OFFICE VISIT (OUTPATIENT)
Dept: OPTOMETRY | Facility: CLINIC | Age: 46
End: 2021-11-22
Payer: COMMERCIAL

## 2021-11-22 DIAGNOSIS — Z97.3 WEARS CONTACT LENSES: ICD-10-CM

## 2021-11-22 DIAGNOSIS — H52.13 MYOPIA OF BOTH EYES: ICD-10-CM

## 2021-11-22 DIAGNOSIS — E10.9 TYPE 1 DIABETES MELLITUS WITHOUT COMPLICATION: Primary | ICD-10-CM

## 2021-11-22 PROCEDURE — 92015 DETERMINE REFRACTIVE STATE: CPT | Mod: S$GLB,,, | Performed by: OPTOMETRIST

## 2021-11-22 PROCEDURE — 92004 PR EYE EXAM, NEW PATIENT,COMPREHESV: ICD-10-PCS | Mod: S$GLB,,, | Performed by: OPTOMETRIST

## 2021-11-22 PROCEDURE — 92310 PR CONTACT LENS FITTING (NO CHANGE): ICD-10-PCS | Mod: S$GLB,,, | Performed by: OPTOMETRIST

## 2021-11-22 PROCEDURE — 92004 COMPRE OPH EXAM NEW PT 1/>: CPT | Mod: S$GLB,,, | Performed by: OPTOMETRIST

## 2021-11-22 PROCEDURE — 99999 PR PBB SHADOW E&M-EST. PATIENT-LVL III: CPT | Mod: PBBFAC,,, | Performed by: OPTOMETRIST

## 2021-11-22 PROCEDURE — 92310 CONTACT LENS FITTING OU: CPT | Mod: S$GLB,,, | Performed by: OPTOMETRIST

## 2021-11-22 PROCEDURE — 92015 PR REFRACTION: ICD-10-PCS | Mod: S$GLB,,, | Performed by: OPTOMETRIST

## 2021-11-22 PROCEDURE — 99999 PR PBB SHADOW E&M-EST. PATIENT-LVL III: ICD-10-PCS | Mod: PBBFAC,,, | Performed by: OPTOMETRIST

## 2021-11-30 ENCOUNTER — TELEPHONE (OUTPATIENT)
Dept: ENDOCRINOLOGY | Facility: CLINIC | Age: 46
End: 2021-11-30
Payer: COMMERCIAL

## 2021-11-30 ENCOUNTER — PATIENT MESSAGE (OUTPATIENT)
Dept: ENDOCRINOLOGY | Facility: CLINIC | Age: 46
End: 2021-11-30
Payer: COMMERCIAL

## 2021-12-01 DIAGNOSIS — R56.9 SEIZURES: Primary | ICD-10-CM

## 2021-12-02 ENCOUNTER — PATIENT MESSAGE (OUTPATIENT)
Dept: ENDOCRINOLOGY | Facility: CLINIC | Age: 46
End: 2021-12-02
Payer: COMMERCIAL

## 2021-12-20 ENCOUNTER — LAB VISIT (OUTPATIENT)
Dept: LAB | Facility: HOSPITAL | Age: 46
End: 2021-12-20
Payer: COMMERCIAL

## 2021-12-20 DIAGNOSIS — E10.9 TYPE 1 DIABETES MELLITUS WITHOUT COMPLICATION: ICD-10-CM

## 2021-12-20 LAB
ESTIMATED AVG GLUCOSE: 123 MG/DL (ref 68–131)
HBA1C MFR BLD: 5.9 % (ref 4–5.6)

## 2021-12-20 PROCEDURE — 36415 COLL VENOUS BLD VENIPUNCTURE: CPT | Mod: PO | Performed by: NURSE PRACTITIONER

## 2021-12-20 PROCEDURE — 83036 HEMOGLOBIN GLYCOSYLATED A1C: CPT | Performed by: NURSE PRACTITIONER

## 2021-12-22 ENCOUNTER — OFFICE VISIT (OUTPATIENT)
Dept: ENDOCRINOLOGY | Facility: CLINIC | Age: 46
End: 2021-12-22
Payer: COMMERCIAL

## 2021-12-22 DIAGNOSIS — E11.649 HYPOGLYCEMIA ASSOCIATED WITH DIABETES: ICD-10-CM

## 2021-12-22 DIAGNOSIS — Z12.5 PROSTATE CANCER SCREENING: ICD-10-CM

## 2021-12-22 DIAGNOSIS — E10.9 TYPE 1 DIABETES MELLITUS WITHOUT COMPLICATION: Primary | ICD-10-CM

## 2021-12-22 DIAGNOSIS — G63 POLYNEUROPATHY ASSOCIATED WITH UNDERLYING DISEASE: ICD-10-CM

## 2021-12-22 DIAGNOSIS — I10 ESSENTIAL HYPERTENSION: ICD-10-CM

## 2021-12-22 PROCEDURE — 3066F PR DOCUMENTATION OF TREATMENT FOR NEPHROPATHY: ICD-10-PCS | Mod: CPTII,95,, | Performed by: NURSE PRACTITIONER

## 2021-12-22 PROCEDURE — 3060F PR POS MICROALBUMINURIA RESULT DOCUMENTED/REVIEW: ICD-10-PCS | Mod: CPTII,95,, | Performed by: NURSE PRACTITIONER

## 2021-12-22 PROCEDURE — 3044F HG A1C LEVEL LT 7.0%: CPT | Mod: CPTII,95,, | Performed by: NURSE PRACTITIONER

## 2021-12-22 PROCEDURE — 3044F PR MOST RECENT HEMOGLOBIN A1C LEVEL <7.0%: ICD-10-PCS | Mod: CPTII,95,, | Performed by: NURSE PRACTITIONER

## 2021-12-22 PROCEDURE — 3066F NEPHROPATHY DOC TX: CPT | Mod: CPTII,95,, | Performed by: NURSE PRACTITIONER

## 2021-12-22 PROCEDURE — 99213 PR OFFICE/OUTPT VISIT, EST, LEVL III, 20-29 MIN: ICD-10-PCS | Mod: 95,,, | Performed by: NURSE PRACTITIONER

## 2021-12-22 PROCEDURE — 4010F PR ACE/ARB THEARPY RXD/TAKEN: ICD-10-PCS | Mod: CPTII,95,, | Performed by: NURSE PRACTITIONER

## 2021-12-22 PROCEDURE — 4010F ACE/ARB THERAPY RXD/TAKEN: CPT | Mod: CPTII,95,, | Performed by: NURSE PRACTITIONER

## 2021-12-22 PROCEDURE — 3060F POS MICROALBUMINURIA REV: CPT | Mod: CPTII,95,, | Performed by: NURSE PRACTITIONER

## 2021-12-22 PROCEDURE — 99213 OFFICE O/P EST LOW 20 MIN: CPT | Mod: 95,,, | Performed by: NURSE PRACTITIONER

## 2022-03-10 ENCOUNTER — TELEPHONE (OUTPATIENT)
Dept: OPHTHALMOLOGY | Facility: CLINIC | Age: 47
End: 2022-03-10
Payer: COMMERCIAL

## 2022-03-10 ENCOUNTER — PATIENT MESSAGE (OUTPATIENT)
Dept: OPTOMETRY | Facility: CLINIC | Age: 47
End: 2022-03-10
Payer: COMMERCIAL

## 2022-03-10 NOTE — TELEPHONE ENCOUNTER
LVM informing pt that Rx is right and the Cyl is for people who have an astigmatism, which he does not. Asked pt to call back with any further questions.

## 2022-04-18 ENCOUNTER — OFFICE VISIT (OUTPATIENT)
Dept: NEUROLOGY | Facility: CLINIC | Age: 47
End: 2022-04-18
Payer: COMMERCIAL

## 2022-04-18 VITALS
HEART RATE: 77 BPM | RESPIRATION RATE: 18 BRPM | SYSTOLIC BLOOD PRESSURE: 120 MMHG | DIASTOLIC BLOOD PRESSURE: 86 MMHG | BODY MASS INDEX: 29.16 KG/M2 | HEIGHT: 72 IN | WEIGHT: 215.25 LBS

## 2022-04-18 DIAGNOSIS — G40.909 SEIZURE DISORDER: ICD-10-CM

## 2022-04-18 DIAGNOSIS — R56.9 SEIZURES: ICD-10-CM

## 2022-04-18 DIAGNOSIS — G63 POLYNEUROPATHY ASSOCIATED WITH UNDERLYING DISEASE: ICD-10-CM

## 2022-04-18 PROCEDURE — 1159F MED LIST DOCD IN RCRD: CPT | Mod: CPTII,S$GLB,, | Performed by: PSYCHIATRY & NEUROLOGY

## 2022-04-18 PROCEDURE — 3072F PR LOW RISK FOR RETINOPATHY: ICD-10-PCS | Mod: CPTII,S$GLB,, | Performed by: PSYCHIATRY & NEUROLOGY

## 2022-04-18 PROCEDURE — 1160F PR REVIEW ALL MEDS BY PRESCRIBER/CLIN PHARMACIST DOCUMENTED: ICD-10-PCS | Mod: CPTII,S$GLB,, | Performed by: PSYCHIATRY & NEUROLOGY

## 2022-04-18 PROCEDURE — 3079F PR MOST RECENT DIASTOLIC BLOOD PRESSURE 80-89 MM HG: ICD-10-PCS | Mod: CPTII,S$GLB,, | Performed by: PSYCHIATRY & NEUROLOGY

## 2022-04-18 PROCEDURE — 99204 PR OFFICE/OUTPT VISIT, NEW, LEVL IV, 45-59 MIN: ICD-10-PCS | Mod: S$GLB,,, | Performed by: PSYCHIATRY & NEUROLOGY

## 2022-04-18 PROCEDURE — 1159F PR MEDICATION LIST DOCUMENTED IN MEDICAL RECORD: ICD-10-PCS | Mod: CPTII,S$GLB,, | Performed by: PSYCHIATRY & NEUROLOGY

## 2022-04-18 PROCEDURE — 3008F PR BODY MASS INDEX (BMI) DOCUMENTED: ICD-10-PCS | Mod: CPTII,S$GLB,, | Performed by: PSYCHIATRY & NEUROLOGY

## 2022-04-18 PROCEDURE — 99204 OFFICE O/P NEW MOD 45 MIN: CPT | Mod: S$GLB,,, | Performed by: PSYCHIATRY & NEUROLOGY

## 2022-04-18 PROCEDURE — 1160F RVW MEDS BY RX/DR IN RCRD: CPT | Mod: CPTII,S$GLB,, | Performed by: PSYCHIATRY & NEUROLOGY

## 2022-04-18 PROCEDURE — 4010F PR ACE/ARB THEARPY RXD/TAKEN: ICD-10-PCS | Mod: CPTII,S$GLB,, | Performed by: PSYCHIATRY & NEUROLOGY

## 2022-04-18 PROCEDURE — 3074F SYST BP LT 130 MM HG: CPT | Mod: CPTII,S$GLB,, | Performed by: PSYCHIATRY & NEUROLOGY

## 2022-04-18 PROCEDURE — 3008F BODY MASS INDEX DOCD: CPT | Mod: CPTII,S$GLB,, | Performed by: PSYCHIATRY & NEUROLOGY

## 2022-04-18 PROCEDURE — 3079F DIAST BP 80-89 MM HG: CPT | Mod: CPTII,S$GLB,, | Performed by: PSYCHIATRY & NEUROLOGY

## 2022-04-18 PROCEDURE — 4010F ACE/ARB THERAPY RXD/TAKEN: CPT | Mod: CPTII,S$GLB,, | Performed by: PSYCHIATRY & NEUROLOGY

## 2022-04-18 PROCEDURE — 3072F LOW RISK FOR RETINOPATHY: CPT | Mod: CPTII,S$GLB,, | Performed by: PSYCHIATRY & NEUROLOGY

## 2022-04-18 PROCEDURE — 99999 PR PBB SHADOW E&M-EST. PATIENT-LVL V: ICD-10-PCS | Mod: PBBFAC,,, | Performed by: PSYCHIATRY & NEUROLOGY

## 2022-04-18 PROCEDURE — 99999 PR PBB SHADOW E&M-EST. PATIENT-LVL V: CPT | Mod: PBBFAC,,, | Performed by: PSYCHIATRY & NEUROLOGY

## 2022-04-18 PROCEDURE — 3074F PR MOST RECENT SYSTOLIC BLOOD PRESSURE < 130 MM HG: ICD-10-PCS | Mod: CPTII,S$GLB,, | Performed by: PSYCHIATRY & NEUROLOGY

## 2022-04-18 RX ORDER — GABAPENTIN 600 MG/1
900 TABLET ORAL 2 TIMES DAILY
Qty: 135 TABLET | Refills: 3 | Status: SHIPPED | OUTPATIENT
Start: 2022-04-18 | End: 2023-01-30

## 2022-04-18 RX ORDER — LEVETIRACETAM 750 MG/1
750 TABLET ORAL 2 TIMES DAILY
Qty: 180 TABLET | Refills: 3 | Status: SHIPPED | OUTPATIENT
Start: 2022-04-18 | End: 2023-05-29 | Stop reason: SDUPTHER

## 2022-04-18 NOTE — PATIENT INSTRUCTIONS
During a seizure:  - Ensure the person is in a safe place, remove hard or sharp objects from the area  - Turn the person on their side  - Never force anything into their mouth  - Keep on eye on the time, if the jerking/shaking/tensing of the muscles lasts > 5 minutes, call 911.    After a seizure:  - Allow them to lie quietly, gently call them to see if they wake up  - If there is injury or if they are not waking up within 5 minutes, call 911    Safety:  - Take showers, not baths  - Take care when around bodies of water (swimming pools, lakes, etc) and wear protective equipment. Do not swim alone  - Use equipment with automatic shutoff safety features  - Do not climb ladders or use heavy machinery until seizure-free for 6 months  - Use the back burners when cooking  - If you have children, change diapers on the floor and avoid holding them while taking stairs  - Do not drive until seizure-free for 6 months    Triggers:  - Be sure to take you medication as scheduled without missed doses  - Be sure to get 8 hours of sleep each night  - Certain medications to avoid:   - Benadryl (diphenhydramine)   - Tramadol (Ultram)   - Certain antibiotics in the fluoroquinolone class (levaquin or cipro)   - Wellbutrin    - Chantix   - Alcohol   - Other illegal drugs or stimulant medications  - Herbal supplements to avoid that can lower the seizure threshold:   - Asafoetida, Borage, Ephedra, Ergot, Eucalyptus, Evening primrose, Ginkgo biloba, Ginseng, Pennyroyal, Hemant, Shankpushpi, Star anise, Star fruit, Wormwood, and Yohimbine

## 2022-04-18 NOTE — PROGRESS NOTES
Date: 4/18/2022    Patient ID: Rajesh Donovan is a 46 y.o. male.    Referring Provider:  Kelley Mena APRN*    Chief Complaint: Seizures and Neuropathy      History of Present Illness:  Mr. Donovan is a 46 y.o. male who presents referred by Kelley Mena APRN* today for evaluation of seizures. He also has type 1 diabetes.     Seizures started at age 22 and 23. 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 He had convulsions. No aura or warning before they were going to happen. His dog would alert them that it was about to happen. He bit his tongue and had urinary incontinence. He had about 10 seizures in the 1.5 years before they stopped. He continued to have seizures until the keppra was upped to his current dose. His last seizure was when he was 24 years old. He doesn't recall having myoclonic jerks.     He had seizures diagnosed in [place] and had MRI and EEG and was told they were normal. He doesn't recall the hospital name.     Current AED:  keppra 750 mg BID (originaly had some weight gain and felt like a zombie but now he has no side effects, mood change)  Gabapentin 900 mg BID (for neuropathy)    He was 33/34 years old when he was diagnosed with type 1 diabetes. He presented with weight loss. He has neuropathy and takes gabapentin. His feet will burn, especially at night. The gabapentin helps some. He finds THC helps more so.   No side effects with the gabapentin.     No family history of seizures. He does have a strong family history of diabetes. No history of head trauma/never diagnosed with concussions.     Allergies:  Review of patient's allergies indicates:   Allergen Reactions    Demerol [meperidine] Swelling     Other reaction(s): severe swollen    Penicillins      Other reaction(s): don't remember  unknown       Current Medications:  Current Outpatient Medications   Medication Sig Dispense Refill    BAQSIMI 3 mg/actuation [place] INSTILL 1 SPRAY INTO NOSTRIL AS NEEDED 2 each PRN     "FREESTYLE TRISHA 14 DAY SENSOR Kit 1 EACH BY MIS.(NON-DRUG COMBO ROUTE) ROUTE CONTINUOUS. 6 kit 3    hydroCHLOROthiazide (HYDRODIURIL) 12.5 MG Tab Take 12.5 mg by mouth as needed.      insulin lispro (HUMALOG KWIKPEN INSULIN) 100 unit/mL pen INJECT 5 TO 15 UNITS INTO THE SKIN 3 TIMES A DAY BEFORE MEALS WITH SLIDING SCALE. MDD 50 UNITS. 45 each 3    lancets (ONETOUCH DELICA LANCETS) 33 gauge Misc 1 lancet by Other route 4 (four) times daily. Pt to use to check glucoses  qid 150 each 12    lisinopriL (PRINIVIL,ZESTRIL) 20 MG tablet Take 20 mg by mouth once daily.      ONETOUCH ULTRA TEST Strp TEST WITH 1 STRIP 5 TIMES DAILY 150 strip 11    pen needle, diabetic (BD ULTRA-FINE ÁNGEL PEN NEEDLE) 32 gauge x 5/32" Ndle Pt to check glucose qid 150 each 12    sildenafil (REVATIO) 20 mg Tab TAKE 3 TABLETS BY MOUTH DAILY AS NEEDED 30 tablet 11    TRESIBA FLEXTOUCH U-100 100 unit/mL (3 mL) InPn INJECT 30 UNITS INTO THE SKIN EVERY EVENING. (Patient taking differently: 10 Units.) 15 Syringe 12    gabapentin (NEURONTIN) 600 MG tablet Take 1.5 tablets (900 mg total) by mouth 2 (two) times daily. 135 tablet 3    levETIRAcetam (KEPPRA) 750 MG Tab Take 1 tablet (750 mg total) by mouth 2 (two) times daily. 180 tablet 3    lisinopriL-hydrochlorothiazide (PRINZIDE,ZESTORETIC) 20-12.5 mg per tablet TAKE 1 TABLET BY MOUTH EVERY DAY (Patient not taking: Reported on 4/18/2022) 90 tablet 3    meloxicam (MOBIC) 15 MG tablet Take 15 mg by mouth once daily.       No current facility-administered medications for this visit.       Past Medical History:  Past Medical History:   Diagnosis Date    Hypertension     Hypoglycemia associated with diabetes 10/17/2019    Type I (juvenile type) diabetes mellitus without mention of complication, uncontrolled 2012       Past Surgical History:  Past Surgical History:   Procedure Laterality Date    ANTERIOR CRUCIATE LIGAMENT REPAIR Right     APPENDECTOMY      COLONOSCOPY      COLONOSCOPY N/A " 7/9/2021    Procedure: COLONOSCOPY;  Surgeon: Sergio Reyez MD;  [facility];  Service: Endoscopy;  Laterality: N/A;    HERNIA REPAIR      VASECTOMY         Family History:  family history is not on file.    Social History:   reports that he has quit smoking. His smoking use included cigarettes. He started smoking about 28 years ago. He has a 5.00 pack-year smoking history. He has never used smokeless tobacco. He reports current alcohol use. He reports current drug use. Frequency: 1.00 time per week. Drugs: Other-see comments and Marijuana.    Physical Exam:  Vitals:    04/18/22 0806   BP: 120/86   Pulse: 77   Resp: 18   Weight: 97.7 kg (215 lb 4.5 oz)   Height: 6' (1.829 m)   PainSc: 0-No pain     Body mass index is 29.2 kg/m².    Neurological Exam:  Mental status: Awake, alert.  Speech/Language: No dysarthria or aphasia on conversation.   Cranial nerves: Pupils equal round and reactive to light, extraocular movements intact, facial strength and sensation intact bilaterally, palate and tongue not examined due to COVID-19 precautions, hearing grossly intact bilaterally. Shoulder shrug normal bilaterally.   Motor: 5 out of 5 strength throughout the upper and lower extremities bilaterally. Normal bulk and tone.   Sensation: Intact to light touch and vibration bilaterally at ankles.  DTR: 2+ at the knees and biceps bilaterally.  Coordination: Finger-nose-finger testing and rapid alternating movements normal bilaterally. No tremor.   Gait: Normal gait    Data:  I have personally reviewed the referring provider's notes, labs, & imaging made available to me today.     Labs:  CBC: No results found for: WBC, HGB, HCT, PLT, MCV, RDW  BMP:   Lab Results   Component Value Date     06/22/2021    K 4.4 06/22/2021     06/22/2021    CO2 24 06/22/2021    BUN 14 06/22/2021    CREATININE 1.0 06/22/2021     (H) 06/22/2021    CALCIUM 9.6 06/22/2021     LFTS;   Lab Results   Component Value Date    PROT  7.1 06/22/2021    ALBUMIN 4.0 06/22/2021    BILITOT 0.4 06/22/2021    AST 24 06/22/2021    ALKPHOS 62 06/22/2021    ALT 18 06/22/2021     COAGS: No results found for: INR, PROTIME, PTT  FLP:   Lab Results   Component Value Date    CHOL 166 06/22/2021    HDL 66 06/22/2021    LDLCALC 88.4 06/22/2021    TRIG 58 06/22/2021    CHOLHDL 39.8 06/22/2021         Assessment and Plan:  Mr. Donovan is a 46 y.o. male referred to me by Kelley Mena, VENESSA* for evaluation of seizures. He had evaluation years ago in [place] but does not recall the name of the hospital. MRI and EEG were reportedly normal. He has been seizure-free since age 24 and does not wish to come off medication. Will continue keppra 750 mg BID.     Will continue gabapentin 900 mg BID for neuropathic pain.      Seizures  -     Ambulatory referral/consult to Neurology    Polyneuropathy associated with underlying disease  -     gabapentin (NEURONTIN) 600 MG tablet; Take 1.5 tablets (900 mg total) by mouth 2 (two) times daily.  Dispense: 135 tablet; Refill: 3    Seizure disorder  -     levETIRAcetam (KEPPRA) 750 MG Tab; Take 1 tablet (750 mg total) by mouth 2 (two) times daily.  Dispense: 180 tablet; Refill: 3        I spent a total of 46 minutes on the day of the visit.This includes face to face time and non-face to face time preparing to see the patient (eg, review of tests), Obtaining and/or reviewing separately obtained history, Documenting clinical information in the electronic or other health record, Independently interpreting results and communicating results to the patient/family/caregiver, or Care coordination.

## 2022-07-18 ENCOUNTER — PATIENT MESSAGE (OUTPATIENT)
Dept: ENDOCRINOLOGY | Facility: CLINIC | Age: 47
End: 2022-07-18
Payer: COMMERCIAL

## 2022-07-19 ENCOUNTER — LAB VISIT (OUTPATIENT)
Dept: LAB | Facility: HOSPITAL | Age: 47
End: 2022-07-19
Payer: COMMERCIAL

## 2022-07-19 DIAGNOSIS — Z12.5 PROSTATE CANCER SCREENING: ICD-10-CM

## 2022-07-19 DIAGNOSIS — E10.9 TYPE 1 DIABETES MELLITUS WITHOUT COMPLICATION: ICD-10-CM

## 2022-07-19 LAB
ALBUMIN SERPL BCP-MCNC: 4 G/DL (ref 3.5–5.2)
ALP SERPL-CCNC: 57 U/L (ref 55–135)
ALT SERPL W/O P-5'-P-CCNC: 20 U/L (ref 10–44)
ANION GAP SERPL CALC-SCNC: 9 MMOL/L (ref 8–16)
AST SERPL-CCNC: 20 U/L (ref 10–40)
BILIRUB SERPL-MCNC: 0.4 MG/DL (ref 0.1–1)
BUN SERPL-MCNC: 14 MG/DL (ref 6–20)
CALCIUM SERPL-MCNC: 9.4 MG/DL (ref 8.7–10.5)
CHLORIDE SERPL-SCNC: 103 MMOL/L (ref 95–110)
CHOLEST SERPL-MCNC: 158 MG/DL (ref 120–199)
CHOLEST/HDLC SERPL: 3.1 {RATIO} (ref 2–5)
CO2 SERPL-SCNC: 26 MMOL/L (ref 23–29)
COMPLEXED PSA SERPL-MCNC: 0.46 NG/ML (ref 0–4)
CREAT SERPL-MCNC: 1 MG/DL (ref 0.5–1.4)
EST. GFR  (AFRICAN AMERICAN): >60 ML/MIN/1.73 M^2
EST. GFR  (NON AFRICAN AMERICAN): >60 ML/MIN/1.73 M^2
ESTIMATED AVG GLUCOSE: 137 MG/DL (ref 68–131)
GLUCOSE SERPL-MCNC: 207 MG/DL (ref 70–110)
HBA1C MFR BLD: 6.4 % (ref 4–5.6)
HDLC SERPL-MCNC: 51 MG/DL (ref 40–75)
HDLC SERPL: 32.3 % (ref 20–50)
LDLC SERPL CALC-MCNC: 77.8 MG/DL (ref 63–159)
NONHDLC SERPL-MCNC: 107 MG/DL
POTASSIUM SERPL-SCNC: 4.4 MMOL/L (ref 3.5–5.1)
PROT SERPL-MCNC: 7.4 G/DL (ref 6–8.4)
SODIUM SERPL-SCNC: 138 MMOL/L (ref 136–145)
TRIGL SERPL-MCNC: 146 MG/DL (ref 30–150)

## 2022-07-19 PROCEDURE — 84153 ASSAY OF PSA TOTAL: CPT | Performed by: NURSE PRACTITIONER

## 2022-07-19 PROCEDURE — 80061 LIPID PANEL: CPT | Performed by: NURSE PRACTITIONER

## 2022-07-19 PROCEDURE — 83036 HEMOGLOBIN GLYCOSYLATED A1C: CPT | Performed by: NURSE PRACTITIONER

## 2022-07-19 PROCEDURE — 36415 COLL VENOUS BLD VENIPUNCTURE: CPT | Mod: PO | Performed by: NURSE PRACTITIONER

## 2022-07-19 PROCEDURE — 80053 COMPREHEN METABOLIC PANEL: CPT | Performed by: NURSE PRACTITIONER

## 2022-07-21 ENCOUNTER — OFFICE VISIT (OUTPATIENT)
Dept: ENDOCRINOLOGY | Facility: CLINIC | Age: 47
End: 2022-07-21
Payer: COMMERCIAL

## 2022-07-21 VITALS
SYSTOLIC BLOOD PRESSURE: 124 MMHG | WEIGHT: 208.69 LBS | HEART RATE: 90 BPM | HEIGHT: 72 IN | BODY MASS INDEX: 28.27 KG/M2 | DIASTOLIC BLOOD PRESSURE: 76 MMHG

## 2022-07-21 DIAGNOSIS — E10.9 TYPE 1 DIABETES MELLITUS WITHOUT COMPLICATION: Primary | ICD-10-CM

## 2022-07-21 DIAGNOSIS — I10 ESSENTIAL HYPERTENSION: ICD-10-CM

## 2022-07-21 PROCEDURE — 3044F HG A1C LEVEL LT 7.0%: CPT | Mod: CPTII,S$GLB,, | Performed by: NURSE PRACTITIONER

## 2022-07-21 PROCEDURE — 3066F NEPHROPATHY DOC TX: CPT | Mod: CPTII,S$GLB,, | Performed by: NURSE PRACTITIONER

## 2022-07-21 PROCEDURE — 3066F PR DOCUMENTATION OF TREATMENT FOR NEPHROPATHY: ICD-10-PCS | Mod: CPTII,S$GLB,, | Performed by: NURSE PRACTITIONER

## 2022-07-21 PROCEDURE — 99999 PR PBB SHADOW E&M-EST. PATIENT-LVL IV: ICD-10-PCS | Mod: PBBFAC,,, | Performed by: NURSE PRACTITIONER

## 2022-07-21 PROCEDURE — 1160F RVW MEDS BY RX/DR IN RCRD: CPT | Mod: CPTII,S$GLB,, | Performed by: NURSE PRACTITIONER

## 2022-07-21 PROCEDURE — 3074F PR MOST RECENT SYSTOLIC BLOOD PRESSURE < 130 MM HG: ICD-10-PCS | Mod: CPTII,S$GLB,, | Performed by: NURSE PRACTITIONER

## 2022-07-21 PROCEDURE — 1159F MED LIST DOCD IN RCRD: CPT | Mod: CPTII,S$GLB,, | Performed by: NURSE PRACTITIONER

## 2022-07-21 PROCEDURE — 3061F PR NEG MICROALBUMINURIA RESULT DOCUMENTED/REVIEW: ICD-10-PCS | Mod: CPTII,S$GLB,, | Performed by: NURSE PRACTITIONER

## 2022-07-21 PROCEDURE — 4010F ACE/ARB THERAPY RXD/TAKEN: CPT | Mod: CPTII,S$GLB,, | Performed by: NURSE PRACTITIONER

## 2022-07-21 PROCEDURE — 3078F DIAST BP <80 MM HG: CPT | Mod: CPTII,S$GLB,, | Performed by: NURSE PRACTITIONER

## 2022-07-21 PROCEDURE — 1159F PR MEDICATION LIST DOCUMENTED IN MEDICAL RECORD: ICD-10-PCS | Mod: CPTII,S$GLB,, | Performed by: NURSE PRACTITIONER

## 2022-07-21 PROCEDURE — 3008F BODY MASS INDEX DOCD: CPT | Mod: CPTII,S$GLB,, | Performed by: NURSE PRACTITIONER

## 2022-07-21 PROCEDURE — 3074F SYST BP LT 130 MM HG: CPT | Mod: CPTII,S$GLB,, | Performed by: NURSE PRACTITIONER

## 2022-07-21 PROCEDURE — 4010F PR ACE/ARB THEARPY RXD/TAKEN: ICD-10-PCS | Mod: CPTII,S$GLB,, | Performed by: NURSE PRACTITIONER

## 2022-07-21 PROCEDURE — 99999 PR PBB SHADOW E&M-EST. PATIENT-LVL IV: CPT | Mod: PBBFAC,,, | Performed by: NURSE PRACTITIONER

## 2022-07-21 PROCEDURE — 1160F PR REVIEW ALL MEDS BY PRESCRIBER/CLIN PHARMACIST DOCUMENTED: ICD-10-PCS | Mod: CPTII,S$GLB,, | Performed by: NURSE PRACTITIONER

## 2022-07-21 PROCEDURE — 3061F NEG MICROALBUMINURIA REV: CPT | Mod: CPTII,S$GLB,, | Performed by: NURSE PRACTITIONER

## 2022-07-21 PROCEDURE — 99213 OFFICE O/P EST LOW 20 MIN: CPT | Mod: S$GLB,,, | Performed by: NURSE PRACTITIONER

## 2022-07-21 PROCEDURE — 3072F PR LOW RISK FOR RETINOPATHY: ICD-10-PCS | Mod: CPTII,S$GLB,, | Performed by: NURSE PRACTITIONER

## 2022-07-21 PROCEDURE — 3078F PR MOST RECENT DIASTOLIC BLOOD PRESSURE < 80 MM HG: ICD-10-PCS | Mod: CPTII,S$GLB,, | Performed by: NURSE PRACTITIONER

## 2022-07-21 PROCEDURE — 3072F LOW RISK FOR RETINOPATHY: CPT | Mod: CPTII,S$GLB,, | Performed by: NURSE PRACTITIONER

## 2022-07-21 PROCEDURE — 99213 PR OFFICE/OUTPT VISIT, EST, LEVL III, 20-29 MIN: ICD-10-PCS | Mod: S$GLB,,, | Performed by: NURSE PRACTITIONER

## 2022-07-21 PROCEDURE — 3044F PR MOST RECENT HEMOGLOBIN A1C LEVEL <7.0%: ICD-10-PCS | Mod: CPTII,S$GLB,, | Performed by: NURSE PRACTITIONER

## 2022-07-21 PROCEDURE — 3008F PR BODY MASS INDEX (BMI) DOCUMENTED: ICD-10-PCS | Mod: CPTII,S$GLB,, | Performed by: NURSE PRACTITIONER

## 2022-07-21 NOTE — PROGRESS NOTES
Subjective:      Patient ID: Rajesh Donovan is a 46 y.o. male.    Chief Complaint:  Routine DM f/u   .    History of Present Illness  Pt is a 46 y.o. WM  with a diagnosis of Type 1 diabetes mellitus diagnosed approximately  2013, as well as chronic conditions pending review including ED.  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 He did have a pump for about a year and hated it.     Interim Events: No acute event. No focal complaints.  Back to [place] out a lot--  Using Nguyen sensor. . Denies any major hypoglycemia issues. If so usually occurs r/t heavy work out, so he carries sugar with him.   States glucoses rise real high after exercising, and then will plummet later. OVer all doing well.     Exercising most days.    May run 5-10 miles.   5 miles on [employer].       Diabetes Flow Sheet:  Diabetes Medications:  Tresbia 12   Humalog  1:12, ISF 1:50   Diabetes Complications:--testicular neuropathy --started with lantus/levemir   Aspirin:   Statin: none  ACE/ARB lisinopril 20 mg   Last Urine Microalbumin:   Last Eye exam:  Last Diabetic Education:            Review of Systems   Constitutional: Negative for activity change and fatigue.   HENT: Negative for hearing loss and trouble swallowing.    Eyes: Negative for photophobia and visual disturbance.        Last Eye Exam:    Respiratory: Negative for cough and shortness of breath.    Cardiovascular: Negative for chest pain and palpitations.   Gastrointestinal: Negative for constipation and diarrhea.   Genitourinary: Negative for frequency and urgency.   Musculoskeletal: Negative for arthralgias and myalgias.   Skin: Negative for rash and wound.   Neurological: Negative for weakness and numbness.   Psychiatric/Behavioral: Negative for sleep disturbance. The patient is not nervous/anxious.        Objective:   Physical Exam  Constitutional:       Appearance: Normal  appearance. He is normal weight.   HENT:      Head: Normocephalic and atraumatic.      Nose: Nose normal.      Mouth/Throat:      Mouth: Mucous membranes are moist.      Pharynx: Oropharynx is clear.   Eyes:      Extraocular Movements: Extraocular movements intact.      Conjunctiva/sclera: Conjunctivae normal.      Pupils: Pupils are equal, round, and reactive to light.   Neck:      Vascular: No carotid bruit.   Cardiovascular:      Rate and Rhythm: Normal rate and regular rhythm.      Pulses: Normal pulses.      Heart sounds: Normal heart sounds.   Pulmonary:      Effort: Pulmonary effort is normal.      Breath sounds: Normal breath sounds.   Musculoskeletal:         General: Normal range of motion.      Cervical back: Normal range of motion and neck supple.      Comments: Feet: no open wounds or calluses. Good pedal care.   Pedal pulses +2 bilaterally  Sensation via monofilament 5/5 biltaerally    Lymphadenopathy:      Cervical: No cervical adenopathy.   Skin:     General: Skin is warm and dry.   Neurological:      General: No focal deficit present.      Mental Status: He is alert and oriented to person, place, and time.   Psychiatric:         Mood and Affect: Mood normal.         Behavior: Behavior normal.         Thought Content: Thought content normal.         Judgment: Judgment normal.         Lab Review:   Hemoglobin A1C   Date Value Ref Range Status   07/19/2022 6.4 (H) 4.0 - 5.6 % Final     Comment:     ADA Screening Guidelines:  5.7-6.4%  Consistent with prediabetes  >or=6.5%  Consistent with diabetes    High levels of fetal hemoglobin interfere with the HbA1C  assay. Heterozygous hemoglobin variants (HbS, HgC, etc)do  not significantly interfere with this assay.   However, presence of multiple variants may affect accuracy.     12/20/2021 5.9 (H) 4.0 - 5.6 % Final     Comment:     ADA Screening Guidelines:  5.7-6.4%  Consistent with prediabetes  >or=6.5%  Consistent with diabetes    High levels of fetal  hemoglobin interfere with the HbA1C  assay. Heterozygous hemoglobin variants (HbS, HgC, etc)do  not significantly interfere with this assay.   However, presence of multiple variants may affect accuracy.     06/22/2021 6.1 (H) 4.0 - 5.6 % Final     Comment:     ADA Screening Guidelines:  5.7-6.4%  Consistent with prediabetes  >or=6.5%  Consistent with diabetes    High levels of fetal hemoglobin interfere with the HbA1C  assay. Heterozygous hemoglobin variants (HbS, HgC, etc)do  not significantly interfere with this assay.   However, presence of multiple variants may affect accuracy.         Chemistry        Component Value Date/Time     07/19/2022 0659    K 4.4 07/19/2022 0659     07/19/2022 0659    CO2 26 07/19/2022 0659    BUN 14 07/19/2022 0659    CREATININE 1.0 07/19/2022 0659     (H) 07/19/2022 0659        Component Value Date/Time    CALCIUM 9.4 07/19/2022 0659    ALKPHOS 57 07/19/2022 0659    AST 20 07/19/2022 0659    ALT 20 07/19/2022 0659    BILITOT 0.4 07/19/2022 0659        Lab Results   Component Value Date    LDLCALC 77.8 07/19/2022     Lab Results   Component Value Date    TSH 0.934 12/24/2020         Assessment:     1. Type 1 diabetes mellitus without complication     2. Essential hypertension           Plan:   Pt declinces statin   On 20 lisinopril     Continue  [id number] units.   HUmalog  CHO 1:12, and ISF 1:50    On work out days, consider decreasing ISF from 1:50 to 1:75         ORDERS 07/21/2022      9 mo  a1c prior

## 2022-08-23 ENCOUNTER — PATIENT MESSAGE (OUTPATIENT)
Dept: ENDOCRINOLOGY | Facility: CLINIC | Age: 47
End: 2022-08-23
Payer: COMMERCIAL

## 2022-11-07 ENCOUNTER — PATIENT MESSAGE (OUTPATIENT)
Dept: ENDOCRINOLOGY | Facility: CLINIC | Age: 47
End: 2022-11-07
Payer: COMMERCIAL

## 2022-11-07 DIAGNOSIS — E10.9 TYPE 1 DIABETES MELLITUS WITHOUT COMPLICATION: Primary | ICD-10-CM

## 2022-11-08 RX ORDER — BLOOD-GLUCOSE SENSOR
EACH MISCELLANEOUS
Qty: 2 EACH | Refills: 6 | Status: SHIPPED | OUTPATIENT
Start: 2022-11-08 | End: 2023-10-02 | Stop reason: SDUPTHER

## 2022-11-08 RX ORDER — INSULIN PUMP SYRINGE, 3 ML
EACH MISCELLANEOUS
Qty: 1 EACH | Refills: 0 | Status: SHIPPED | OUTPATIENT
Start: 2022-11-08 | End: 2024-02-28 | Stop reason: SDUPTHER

## 2022-11-08 RX ORDER — LANCETS
EACH MISCELLANEOUS
Qty: 300 EACH | Refills: 2 | Status: SHIPPED | OUTPATIENT
Start: 2022-11-08 | End: 2022-11-21

## 2022-11-17 ENCOUNTER — PATIENT MESSAGE (OUTPATIENT)
Dept: OPTOMETRY | Facility: CLINIC | Age: 47
End: 2022-11-17
Payer: COMMERCIAL

## 2022-11-21 ENCOUNTER — OFFICE VISIT (OUTPATIENT)
Dept: OPTOMETRY | Facility: CLINIC | Age: 47
End: 2022-11-21
Payer: COMMERCIAL

## 2022-11-21 DIAGNOSIS — Z97.3 WEARS CONTACT LENSES: ICD-10-CM

## 2022-11-21 DIAGNOSIS — H52.13 MYOPIA OF BOTH EYES: ICD-10-CM

## 2022-11-21 DIAGNOSIS — Z46.0 FITTING AND ADJUSTMENT OF SPECTACLES AND CONTACT LENSES: Primary | ICD-10-CM

## 2022-11-21 DIAGNOSIS — E10.9 TYPE 1 DIABETES MELLITUS WITHOUT COMPLICATION: Primary | ICD-10-CM

## 2022-11-21 PROCEDURE — 3066F PR DOCUMENTATION OF TREATMENT FOR NEPHROPATHY: ICD-10-PCS | Mod: CPTII,S$GLB,, | Performed by: OPTOMETRIST

## 2022-11-21 PROCEDURE — 2023F DILAT RTA XM W/O RTNOPTHY: CPT | Mod: CPTII,S$GLB,, | Performed by: OPTOMETRIST

## 2022-11-21 PROCEDURE — 92014 COMPRE OPH EXAM EST PT 1/>: CPT | Mod: S$GLB,,, | Performed by: OPTOMETRIST

## 2022-11-21 PROCEDURE — 92310 CONTACT LENS FITTING OU: CPT | Mod: CSM,,, | Performed by: OPTOMETRIST

## 2022-11-21 PROCEDURE — 4010F ACE/ARB THERAPY RXD/TAKEN: CPT | Mod: CPTII,S$GLB,, | Performed by: OPTOMETRIST

## 2022-11-21 PROCEDURE — 3061F PR NEG MICROALBUMINURIA RESULT DOCUMENTED/REVIEW: ICD-10-PCS | Mod: CPTII,S$GLB,, | Performed by: OPTOMETRIST

## 2022-11-21 PROCEDURE — 3044F HG A1C LEVEL LT 7.0%: CPT | Mod: CPTII,S$GLB,, | Performed by: OPTOMETRIST

## 2022-11-21 PROCEDURE — 92310 PR CONTACT LENS FITTING (NO CHANGE): ICD-10-PCS | Mod: CSM,,, | Performed by: OPTOMETRIST

## 2022-11-21 PROCEDURE — 99999 PR PBB SHADOW E&M-EST. PATIENT-LVL III: CPT | Mod: PBBFAC,,, | Performed by: OPTOMETRIST

## 2022-11-21 PROCEDURE — 4010F PR ACE/ARB THEARPY RXD/TAKEN: ICD-10-PCS | Mod: CPTII,S$GLB,, | Performed by: OPTOMETRIST

## 2022-11-21 PROCEDURE — 99999 PR PBB SHADOW E&M-EST. PATIENT-LVL III: ICD-10-PCS | Mod: PBBFAC,,, | Performed by: OPTOMETRIST

## 2022-11-21 PROCEDURE — 3061F NEG MICROALBUMINURIA REV: CPT | Mod: CPTII,S$GLB,, | Performed by: OPTOMETRIST

## 2022-11-21 PROCEDURE — 3066F NEPHROPATHY DOC TX: CPT | Mod: CPTII,S$GLB,, | Performed by: OPTOMETRIST

## 2022-11-21 PROCEDURE — 92014 PR EYE EXAM, EST PATIENT,COMPREHESV: ICD-10-PCS | Mod: S$GLB,,, | Performed by: OPTOMETRIST

## 2022-11-21 PROCEDURE — 2023F PR DILATED RETINAL EXAM W/O EVID OF RETINOPATHY: ICD-10-PCS | Mod: CPTII,S$GLB,, | Performed by: OPTOMETRIST

## 2022-11-21 PROCEDURE — 3044F PR MOST RECENT HEMOGLOBIN A1C LEVEL <7.0%: ICD-10-PCS | Mod: CPTII,S$GLB,, | Performed by: OPTOMETRIST

## 2022-11-21 NOTE — PROGRESS NOTES
HPI     Diabetic Eye Exam     Additional comments: DM eye exam with clfu           Comments    DLS: 11/22/21    Pt states no va complaints at this time. Doing well with present contacts.       LBSL: 88 this am  Hemoglobin A1C       Date                     Value               Ref Range             Status                07/19/2022               6.4 (H)             4.0 - 5.6 %           Final                 12/20/2021               5.9 (H)             4.0 - 5.6 %           Final                 06/22/2021               6.1 (H)             4.0 - 5.6 %           Final                    Last edited by Cheryle Quintana on 11/21/2022  8:12 AM.            Assessment /Plan     For exam results, see Encounter Report.    Type 1 diabetes mellitus without complication    Myopia of both eyes    Wears contact lenses      No diabetic retinopathy, no csme. Return in 1 year for dilated eye exam.

## 2022-11-22 ENCOUNTER — PATIENT MESSAGE (OUTPATIENT)
Dept: OPTOMETRY | Facility: CLINIC | Age: 47
End: 2022-11-22
Payer: COMMERCIAL

## 2023-02-07 ENCOUNTER — PATIENT MESSAGE (OUTPATIENT)
Dept: ENDOCRINOLOGY | Facility: CLINIC | Age: 48
End: 2023-02-07
Payer: COMMERCIAL

## 2023-02-20 ENCOUNTER — LAB VISIT (OUTPATIENT)
Dept: LAB | Facility: HOSPITAL | Age: 48
End: 2023-02-20
Payer: COMMERCIAL

## 2023-02-20 DIAGNOSIS — E10.9 TYPE 1 DIABETES MELLITUS WITHOUT COMPLICATION: ICD-10-CM

## 2023-02-20 LAB
ESTIMATED AVG GLUCOSE: 108 MG/DL (ref 68–131)
HBA1C MFR BLD: 5.4 % (ref 4–5.6)

## 2023-02-20 PROCEDURE — 36415 COLL VENOUS BLD VENIPUNCTURE: CPT | Mod: PO | Performed by: NURSE PRACTITIONER

## 2023-02-20 PROCEDURE — 83036 HEMOGLOBIN GLYCOSYLATED A1C: CPT | Performed by: NURSE PRACTITIONER

## 2023-02-22 ENCOUNTER — OFFICE VISIT (OUTPATIENT)
Dept: ENDOCRINOLOGY | Facility: CLINIC | Age: 48
End: 2023-02-22
Payer: COMMERCIAL

## 2023-02-22 VITALS
HEART RATE: 80 BPM | WEIGHT: 205 LBS | BODY MASS INDEX: 27.77 KG/M2 | RESPIRATION RATE: 18 BRPM | HEIGHT: 72 IN | DIASTOLIC BLOOD PRESSURE: 68 MMHG | SYSTOLIC BLOOD PRESSURE: 122 MMHG

## 2023-02-22 DIAGNOSIS — G63 POLYNEUROPATHY ASSOCIATED WITH UNDERLYING DISEASE: ICD-10-CM

## 2023-02-22 DIAGNOSIS — I10 ESSENTIAL HYPERTENSION: ICD-10-CM

## 2023-02-22 DIAGNOSIS — E10.9 TYPE 1 DIABETES MELLITUS WITHOUT COMPLICATION: Primary | ICD-10-CM

## 2023-02-22 PROCEDURE — 3044F PR MOST RECENT HEMOGLOBIN A1C LEVEL <7.0%: ICD-10-PCS | Mod: CPTII,S$GLB,, | Performed by: NURSE PRACTITIONER

## 2023-02-22 PROCEDURE — 3078F PR MOST RECENT DIASTOLIC BLOOD PRESSURE < 80 MM HG: ICD-10-PCS | Mod: CPTII,S$GLB,, | Performed by: NURSE PRACTITIONER

## 2023-02-22 PROCEDURE — 99214 PR OFFICE/OUTPT VISIT, EST, LEVL IV, 30-39 MIN: ICD-10-PCS | Mod: 25,S$GLB,, | Performed by: NURSE PRACTITIONER

## 2023-02-22 PROCEDURE — 3072F LOW RISK FOR RETINOPATHY: CPT | Mod: CPTII,S$GLB,, | Performed by: NURSE PRACTITIONER

## 2023-02-22 PROCEDURE — 3078F DIAST BP <80 MM HG: CPT | Mod: CPTII,S$GLB,, | Performed by: NURSE PRACTITIONER

## 2023-02-22 PROCEDURE — 99214 OFFICE O/P EST MOD 30 MIN: CPT | Mod: 25,S$GLB,, | Performed by: NURSE PRACTITIONER

## 2023-02-22 PROCEDURE — 3008F PR BODY MASS INDEX (BMI) DOCUMENTED: ICD-10-PCS | Mod: CPTII,S$GLB,, | Performed by: NURSE PRACTITIONER

## 2023-02-22 PROCEDURE — 3008F BODY MASS INDEX DOCD: CPT | Mod: CPTII,S$GLB,, | Performed by: NURSE PRACTITIONER

## 2023-02-22 PROCEDURE — 3074F PR MOST RECENT SYSTOLIC BLOOD PRESSURE < 130 MM HG: ICD-10-PCS | Mod: CPTII,S$GLB,, | Performed by: NURSE PRACTITIONER

## 2023-02-22 PROCEDURE — 99999 PR PBB SHADOW E&M-EST. PATIENT-LVL III: ICD-10-PCS | Mod: PBBFAC,,, | Performed by: NURSE PRACTITIONER

## 2023-02-22 PROCEDURE — 3072F PR LOW RISK FOR RETINOPATHY: ICD-10-PCS | Mod: CPTII,S$GLB,, | Performed by: NURSE PRACTITIONER

## 2023-02-22 PROCEDURE — 99999 PR PBB SHADOW E&M-EST. PATIENT-LVL III: CPT | Mod: PBBFAC,,, | Performed by: NURSE PRACTITIONER

## 2023-02-22 PROCEDURE — 4010F ACE/ARB THERAPY RXD/TAKEN: CPT | Mod: CPTII,S$GLB,, | Performed by: NURSE PRACTITIONER

## 2023-02-22 PROCEDURE — 1159F MED LIST DOCD IN RCRD: CPT | Mod: CPTII,S$GLB,, | Performed by: NURSE PRACTITIONER

## 2023-02-22 PROCEDURE — 3044F HG A1C LEVEL LT 7.0%: CPT | Mod: CPTII,S$GLB,, | Performed by: NURSE PRACTITIONER

## 2023-02-22 PROCEDURE — 3074F SYST BP LT 130 MM HG: CPT | Mod: CPTII,S$GLB,, | Performed by: NURSE PRACTITIONER

## 2023-02-22 PROCEDURE — 4010F PR ACE/ARB THEARPY RXD/TAKEN: ICD-10-PCS | Mod: CPTII,S$GLB,, | Performed by: NURSE PRACTITIONER

## 2023-02-22 PROCEDURE — 1159F PR MEDICATION LIST DOCUMENTED IN MEDICAL RECORD: ICD-10-PCS | Mod: CPTII,S$GLB,, | Performed by: NURSE PRACTITIONER

## 2023-02-22 NOTE — PROGRESS NOTES
Subjective:      Patient ID: Rajesh Donovan is a 47 y.o. male.    Chief Complaint:  Routine DM f/u   .    History of Present Illness  Pt is a 47 y.o. WM  with a diagnosis of Type 1 diabetes mellitus diagnosed approximately  2013, as well as chronic conditions pending review including ED.  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 He did have a pump for about a year and hated it.     Interim Events: No acute event. No focal complaints.  Back to [place] out a lot--  Using Nguyen sensor. . Denies any major hypoglycemia issues. If so usually occurs r/t heavy work out, so he carries sugar with him.  Basically no change since last visit. Getting  in April.     Current DM meds:   Tresiba 12,  Humalog 1:12 and ISF 1:50.        Failed DM meds:  none   Statin: refuses        Not tolerated statin : na  ACE/ARB:lisinopril 20 mg        Not tolerated ACE/ARB: na   Known Diabetic complications: neuropathy       Exercising most days.    May run 5-10 miles.   5 miles on [email or web address].     Sensor interpretation-- For most part hovering in 100's--a few slight prandial glucose excursions, and some mild hypog, primarily early morning.      Diabetes Flow Sheet:  Diabetes Medications:  Tresbia 12   Humalog  1:12, ISF 1:50   Diabetes Complications:--testicular neuropathy --started with lantus/levemir   Aspirin:   Statin: none  ACE/ARB lisinopril 20 mg   Last Urine Microalbumin:   Last Eye exam:  Last Diabetic Education:            Review of Systems   Constitutional:  Negative for activity change and fatigue.   HENT:  Negative for hearing loss and trouble swallowing.    Eyes:  Negative for photophobia and visual disturbance.        Last Eye Exam:    Respiratory:  Negative for cough and shortness of breath.    Cardiovascular:  Negative for chest pain and palpitations.   Gastrointestinal:  Negative for constipation and diarrhea.    Genitourinary:  Negative for frequency and urgency.   Musculoskeletal:  Negative for arthralgias and myalgias.   Skin:  Negative for rash and wound.   Neurological:  Negative for weakness and numbness.   Psychiatric/Behavioral:  Negative for sleep disturbance. The patient is not nervous/anxious.      Objective:   Physical Exam  Constitutional:       Appearance: Normal appearance. He is normal weight.   HENT:      Head: Normocephalic and atraumatic.      Nose: Nose normal.      Mouth/Throat:      Mouth: Mucous membranes are moist.      Pharynx: Oropharynx is clear.   Eyes:      Extraocular Movements: Extraocular movements intact.      Conjunctiva/sclera: Conjunctivae normal.      Pupils: Pupils are equal, round, and reactive to light.   Neck:      Vascular: No carotid bruit.   Cardiovascular:      Rate and Rhythm: Normal rate and regular rhythm.      Pulses: Normal pulses.      Heart sounds: Normal heart sounds.   Pulmonary:      Effort: Pulmonary effort is normal.      Breath sounds: Normal breath sounds.   Musculoskeletal:         General: Normal range of motion.      Cervical back: Normal range of motion and neck supple.      Comments: Feet: no open wounds or calluses. Good pedal care.   Pedal pulses +2 bilaterally  Sensation via monofilament 5/5 biltaerally    Lymphadenopathy:      Cervical: No cervical adenopathy.   Skin:     General: Skin is warm and dry.   Neurological:      General: No focal deficit present.      Mental Status: He is alert and oriented to person, place, and time.   Psychiatric:         Mood and Affect: Mood normal.         Behavior: Behavior normal.         Thought Content: Thought content normal.         Judgment: Judgment normal.       Lab Review:   Hemoglobin A1C   Date Value Ref Range Status   02/20/2023 5.4 4.0 - 5.6 % Final     Comment:     ADA Screening Guidelines:  5.7-6.4%  Consistent with prediabetes  >or=6.5%  Consistent with diabetes    High levels of fetal hemoglobin interfere  with the HbA1C  assay. Heterozygous hemoglobin variants (HbS, HgC, etc)do  not significantly interfere with this assay.   However, presence of multiple variants may affect accuracy.     07/19/2022 6.4 (H) 4.0 - 5.6 % Final     Comment:     ADA Screening Guidelines:  5.7-6.4%  Consistent with prediabetes  >or=6.5%  Consistent with diabetes    High levels of fetal hemoglobin interfere with the HbA1C  assay. Heterozygous hemoglobin variants (HbS, HgC, etc)do  not significantly interfere with this assay.   However, presence of multiple variants may affect accuracy.     12/20/2021 5.9 (H) 4.0 - 5.6 % Final     Comment:     ADA Screening Guidelines:  5.7-6.4%  Consistent with prediabetes  >or=6.5%  Consistent with diabetes    High levels of fetal hemoglobin interfere with the HbA1C  assay. Heterozygous hemoglobin variants (HbS, HgC, etc)do  not significantly interfere with this assay.   However, presence of multiple variants may affect accuracy.         Chemistry        Component Value Date/Time     07/19/2022 0659    K 4.4 07/19/2022 0659     07/19/2022 0659    CO2 26 07/19/2022 0659    BUN 14 07/19/2022 0659    CREATININE 1.0 07/19/2022 0659     (H) 07/19/2022 0659        Component Value Date/Time    CALCIUM 9.4 07/19/2022 0659    ALKPHOS 57 07/19/2022 0659    AST 20 07/19/2022 0659    ALT 20 07/19/2022 0659    BILITOT 0.4 07/19/2022 0659        Lab Results   Component Value Date    LDLCALC 77.8 07/19/2022     Lab Results   Component Value Date    TSH 0.934 12/24/2020         Assessment:     1. Type 1 diabetes mellitus without complication  Comprehensive Metabolic Panel    Hemoglobin A1C    Lipid Panel    Microalbumin/Creatinine Ratio, Urine    TSH    PSA, Screening     DIABETES FOOT EXAM      2. Essential hypertension        3. Polyneuropathy associated with underlying disease            Plan:   Pt declinces statin   On 20 lisinopril     Continue  [id number] units.   HUmalog  CHO 1:12, and ISF 1:50     On work out days, consider decreasing ISF from 1:50 to 1:75         ORDERS 02/22/2023     6  mo with fasting cmp, lipids, a1c, tsh, psa, ua urine m/c

## 2023-05-16 DIAGNOSIS — G63 POLYNEUROPATHY ASSOCIATED WITH UNDERLYING DISEASE: ICD-10-CM

## 2023-05-16 DIAGNOSIS — E10.9 TYPE 1 DIABETES MELLITUS WITHOUT COMPLICATION: ICD-10-CM

## 2023-05-16 RX ORDER — GABAPENTIN 600 MG/1
TABLET ORAL
Qty: 270 TABLET | Refills: 0 | Status: SHIPPED | OUTPATIENT
Start: 2023-05-16 | End: 2023-05-29 | Stop reason: SDUPTHER

## 2023-05-17 RX ORDER — LISINOPRIL AND HYDROCHLOROTHIAZIDE 12.5; 2 MG/1; MG/1
TABLET ORAL
Qty: 90 TABLET | Refills: 2 | Status: SHIPPED | OUTPATIENT
Start: 2023-05-17 | End: 2023-11-29

## 2023-05-22 DIAGNOSIS — E10.9 TYPE 1 DIABETES MELLITUS WITHOUT COMPLICATION: ICD-10-CM

## 2023-05-23 RX ORDER — SILDENAFIL CITRATE 20 MG/1
TABLET ORAL
Qty: 30 TABLET | Refills: 11 | Status: SHIPPED | OUTPATIENT
Start: 2023-05-23

## 2023-05-29 ENCOUNTER — OFFICE VISIT (OUTPATIENT)
Dept: NEUROLOGY | Facility: CLINIC | Age: 48
End: 2023-05-29
Payer: COMMERCIAL

## 2023-05-29 VITALS
SYSTOLIC BLOOD PRESSURE: 141 MMHG | HEART RATE: 93 BPM | HEIGHT: 72 IN | BODY MASS INDEX: 28.83 KG/M2 | DIASTOLIC BLOOD PRESSURE: 78 MMHG | WEIGHT: 212.88 LBS

## 2023-05-29 DIAGNOSIS — E10.9 TYPE 1 DIABETES MELLITUS WITHOUT COMPLICATION: ICD-10-CM

## 2023-05-29 DIAGNOSIS — G63 POLYNEUROPATHY ASSOCIATED WITH UNDERLYING DISEASE: ICD-10-CM

## 2023-05-29 DIAGNOSIS — G40.909 SEIZURE DISORDER: Primary | ICD-10-CM

## 2023-05-29 PROCEDURE — 3044F PR MOST RECENT HEMOGLOBIN A1C LEVEL <7.0%: ICD-10-PCS | Mod: CPTII,S$GLB,, | Performed by: PSYCHIATRY & NEUROLOGY

## 2023-05-29 PROCEDURE — 1159F MED LIST DOCD IN RCRD: CPT | Mod: CPTII,S$GLB,, | Performed by: PSYCHIATRY & NEUROLOGY

## 2023-05-29 PROCEDURE — 4010F PR ACE/ARB THEARPY RXD/TAKEN: ICD-10-PCS | Mod: CPTII,S$GLB,, | Performed by: PSYCHIATRY & NEUROLOGY

## 2023-05-29 PROCEDURE — 1159F PR MEDICATION LIST DOCUMENTED IN MEDICAL RECORD: ICD-10-PCS | Mod: CPTII,S$GLB,, | Performed by: PSYCHIATRY & NEUROLOGY

## 2023-05-29 PROCEDURE — 3077F PR MOST RECENT SYSTOLIC BLOOD PRESSURE >= 140 MM HG: ICD-10-PCS | Mod: CPTII,S$GLB,, | Performed by: PSYCHIATRY & NEUROLOGY

## 2023-05-29 PROCEDURE — 3078F DIAST BP <80 MM HG: CPT | Mod: CPTII,S$GLB,, | Performed by: PSYCHIATRY & NEUROLOGY

## 2023-05-29 PROCEDURE — 3078F PR MOST RECENT DIASTOLIC BLOOD PRESSURE < 80 MM HG: ICD-10-PCS | Mod: CPTII,S$GLB,, | Performed by: PSYCHIATRY & NEUROLOGY

## 2023-05-29 PROCEDURE — 99214 OFFICE O/P EST MOD 30 MIN: CPT | Mod: S$GLB,,, | Performed by: PSYCHIATRY & NEUROLOGY

## 2023-05-29 PROCEDURE — 4010F ACE/ARB THERAPY RXD/TAKEN: CPT | Mod: CPTII,S$GLB,, | Performed by: PSYCHIATRY & NEUROLOGY

## 2023-05-29 PROCEDURE — 99999 PR PBB SHADOW E&M-EST. PATIENT-LVL III: CPT | Mod: PBBFAC,,, | Performed by: PSYCHIATRY & NEUROLOGY

## 2023-05-29 PROCEDURE — 99214 PR OFFICE/OUTPT VISIT, EST, LEVL IV, 30-39 MIN: ICD-10-PCS | Mod: S$GLB,,, | Performed by: PSYCHIATRY & NEUROLOGY

## 2023-05-29 PROCEDURE — 3072F LOW RISK FOR RETINOPATHY: CPT | Mod: CPTII,S$GLB,, | Performed by: PSYCHIATRY & NEUROLOGY

## 2023-05-29 PROCEDURE — 3044F HG A1C LEVEL LT 7.0%: CPT | Mod: CPTII,S$GLB,, | Performed by: PSYCHIATRY & NEUROLOGY

## 2023-05-29 PROCEDURE — 3008F BODY MASS INDEX DOCD: CPT | Mod: CPTII,S$GLB,, | Performed by: PSYCHIATRY & NEUROLOGY

## 2023-05-29 PROCEDURE — 3077F SYST BP >= 140 MM HG: CPT | Mod: CPTII,S$GLB,, | Performed by: PSYCHIATRY & NEUROLOGY

## 2023-05-29 PROCEDURE — 3072F PR LOW RISK FOR RETINOPATHY: ICD-10-PCS | Mod: CPTII,S$GLB,, | Performed by: PSYCHIATRY & NEUROLOGY

## 2023-05-29 PROCEDURE — 3008F PR BODY MASS INDEX (BMI) DOCUMENTED: ICD-10-PCS | Mod: CPTII,S$GLB,, | Performed by: PSYCHIATRY & NEUROLOGY

## 2023-05-29 PROCEDURE — 99999 PR PBB SHADOW E&M-EST. PATIENT-LVL III: ICD-10-PCS | Mod: PBBFAC,,, | Performed by: PSYCHIATRY & NEUROLOGY

## 2023-05-29 PROCEDURE — 1160F PR REVIEW ALL MEDS BY PRESCRIBER/CLIN PHARMACIST DOCUMENTED: ICD-10-PCS | Mod: CPTII,S$GLB,, | Performed by: PSYCHIATRY & NEUROLOGY

## 2023-05-29 PROCEDURE — 1160F RVW MEDS BY RX/DR IN RCRD: CPT | Mod: CPTII,S$GLB,, | Performed by: PSYCHIATRY & NEUROLOGY

## 2023-05-29 RX ORDER — GABAPENTIN 600 MG/1
TABLET ORAL
Qty: 270 TABLET | Refills: 3 | Status: SHIPPED | OUTPATIENT
Start: 2023-05-29

## 2023-05-29 RX ORDER — LEVETIRACETAM 750 MG/1
750 TABLET ORAL 2 TIMES DAILY
Qty: 180 TABLET | Refills: 3 | Status: SHIPPED | OUTPATIENT
Start: 2023-05-29 | End: 2023-11-29

## 2023-05-29 RX ORDER — GLUCAGON 3 MG/1
POWDER NASAL
Qty: 2 EACH | Status: SHIPPED | OUTPATIENT
Start: 2023-05-29

## 2023-05-29 NOTE — PROGRESS NOTES
Date: 5/29/2023    Patient ID: Rajesh Donovan is a 47 y.o. male.    Chief Complaint: Seizures      History of Present Illness:  Mr. Donovan is a 47 y.o. male who presents for followup of epilepsy. He also has neuropathy from type 1 DM.     Interval history: He has continued on keppra 750 mg BID and gabapentin 900 mg BID. No seizures. No side effects from the medication.     His neuropathy is stable and gabapentin controls the pain     Prior HPI:  Seizures started at age 22 and 23. 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 He had convulsions. No aura or warning before they were going to happen. His dog would alert them that it was about to happen. He bit his tongue and had urinary incontinence. He had about 10 seizures in the 1.5 years before they stopped. He continued to have seizures until the keppra was upped to his current dose. His last seizure was when he was 24 years old. He doesn't recall having myoclonic jerks.      He had seizures diagnosed in [place] and had MRI and EEG and was told they were normal. He doesn't recall the hospital name.      Current AED:  keppra 750 mg BID (originaly had some weight gain and felt like a zombie but now he has no side effects, mood change)  Gabapentin 900 mg BID (for neuropathy)     He was 33/34 years old when he was diagnosed with type 1 diabetes. He presented with weight loss. He has neuropathy and takes gabapentin. His feet will burn, especially at night. The gabapentin helps some. He finds THC helps more so.   No side effects with the gabapentin.      No family history of seizures. He does have a strong family history of diabetes. No history of head trauma/never diagnosed with concussions.        Allergies:  Review of patient's allergies indicates:   Allergen Reactions    Demerol [meperidine] Swelling     Other reaction(s): severe swollen    Penicillins      Other reaction(s): don't remember  unknown       Current Medications:  Current Outpatient Medications  "  Medication Sig Dispense Refill    BAQSIMI 3 mg/actuation [place] INSTILL 1 SPRAY INTO NOSTRIL AS NEEDED 2 each PRN    blood sugar diagnostic (FREESTYLE LITE STRIPS) Strp CHECK BLOOD SUGAR 3 TIMES A  strip 2    blood-glucose meter kit To check BG 3 times daily, to use with insurance preferred meter. 1 each 0    blood-glucose sensor (FREESTYLE TRISHA 3 SENSOR) Casie Change device every 14 days 2 each 6    FREESTYLE TRISHA 14 DAY SENSOR Kit USE AS DIRECTED 6 kit 3    hydroCHLOROthiazide (HYDRODIURIL) 12.5 MG Tab Take 12.5 mg by mouth as needed.      insulin lispro (HUMALOG KWIKPEN INSULIN) 100 unit/mL pen INJECT 5 TO 15 UNITS INTO THE SKIN 3 TIMES A DAY BEFORE MEALS WITH SLIDING SCALE. MAX: 50 UNITS. 45 each 3    lancets (ONETOUCH DELICA LANCETS) 33 gauge Misc 1 lancet by Other route 4 (four) times daily. Pt to use to check glucoses  qid 150 each 12    lisinopriL (PRINIVIL,ZESTRIL) 20 MG tablet Take 20 mg by mouth once daily.      lisinopriL-hydrochlorothiazide (PRINZIDE,ZESTORETIC) 20-12.5 mg per tablet TAKE 1 TABLET BY MOUTH EVERY DAY 90 tablet 2    ONETOUCH ULTRA TEST Strp TEST WITH 1 STRIP 5 TIMES DAILY 150 strip 11    pen needle, diabetic (BD ULTRA-FINE ÁNGEL PEN NEEDLE) 32 gauge x 5/32" Ndle Pt to check glucose qid 150 each 12    sildenafil (REVATIO) 20 mg Tab TAKE 3 TABLETS BY MOUTH DAILY AS NEEDED 30 tablet 11    TRESIBA FLEXTOUCH U-100 100 unit/mL (3 mL) insulin pen INJECT 30 UNITS INTO THE SKIN EVERY EVENING. 3 pen 12    gabapentin (NEURONTIN) 600 MG tablet TAKE 1 & 1/2 TABLETS (900 MG TOTAL) BY MOUTH 2 (TWO) TIMES DAILY. 270 tablet 3    levETIRAcetam (KEPPRA) 750 MG Tab Take 1 tablet (750 mg total) by mouth 2 (two) times daily. 180 tablet 3    meloxicam (MOBIC) 15 MG tablet Take 15 mg by mouth once daily.       No current facility-administered medications for this visit.       Past Medical History:  Past Medical History:   Diagnosis Date    Hypertension     Hypoglycemia associated with diabetes 10/17/2019 "    Type I (juvenile type) diabetes mellitus without mention of complication, uncontrolled 2012       Past Surgical History:  Past Surgical History:   Procedure Laterality Date    ANTERIOR CRUCIATE LIGAMENT REPAIR Right     APPENDECTOMY      COLONOSCOPY      COLONOSCOPY N/A 7/9/2021    Procedure: COLONOSCOPY;  Surgeon: Sergio Reyez MD;  Location: [facility];  Service: Endoscopy;  Laterality: N/A;    HERNIA REPAIR      VASECTOMY         Family History:  family history is not on file.    Social History:   reports that he has quit smoking. His smoking use included cigarettes. He started smoking about 29 years ago. He has a 5.00 pack-year smoking history. He has never used smokeless tobacco. He reports current alcohol use. He reports current drug use. Frequency: 1.00 time per week. Drugs: Other-see comments and Marijuana.    Physical Exam:  Vitals:    05/29/23 0948   BP: (!) 141/78   Pulse: 93   Weight: 96.6 kg (212 lb 13.7 oz)   Height: 6' (1.829 m)   PainSc: 0-No pain     Body mass index is 28.87 kg/m².    Neurological Exam:  Mental status: Awake and alert  Speech language: No dysarthria or aphasia on conversation  Cranial nerves: Face symmetric  Motor: Moves all extremities well  Sensory: intact to vibration at ankles and finger tips  Coordination: No ataxia. No tremor.      Data:  I have personally reviewed other provider's notes, labs, & imaging made available to me today.     Labs:  CBC: No results found for: WBC, HGB, HCT, PLT, MCV, RDW  BMP:   Lab Results   Component Value Date     07/19/2022    K 4.4 07/19/2022     07/19/2022    CO2 26 07/19/2022    BUN 14 07/19/2022    CREATININE 1.0 07/19/2022     (H) 07/19/2022    CALCIUM 9.4 07/19/2022     LFTS;   Lab Results   Component Value Date    PROT 7.4 07/19/2022    ALBUMIN 4.0 07/19/2022    BILITOT 0.4 07/19/2022    AST 20 07/19/2022    ALKPHOS 57 07/19/2022    ALT 20 07/19/2022     COAGS: No results found for: INR, PROTIME, PTT  FLP:   Lab  Results   Component Value Date    CHOL 158 07/19/2022    HDL 51 07/19/2022    LDLCALC 77.8 07/19/2022    TRIG 146 07/19/2022    CHOLHDL 32.3 07/19/2022         Assessment and Plan:  Mr. Donovan is a 47 y.o. male here for followup of seizure disorder. He is doing well on keppra 750 mg BID. No side effects and no seizures. Will continue.     His neuropathy is stable and pain is controlled by gabapentin.     He will continue to f/u with endo for DM management.     Seizure disorder  -     levETIRAcetam (KEPPRA) 750 MG Tab; Take 1 tablet (750 mg total) by mouth 2 (two) times daily.  Dispense: 180 tablet; Refill: 3    Polyneuropathy associated with underlying disease  -     gabapentin (NEURONTIN) 600 MG tablet; TAKE 1 & 1/2 TABLETS (900 MG TOTAL) BY MOUTH 2 (TWO) TIMES DAILY.  Dispense: 270 tablet; Refill: 3    Type 1 diabetes mellitus without complication

## 2023-08-21 DIAGNOSIS — E10.9 TYPE 1 DIABETES MELLITUS WITHOUT COMPLICATION: ICD-10-CM

## 2023-10-02 ENCOUNTER — PATIENT MESSAGE (OUTPATIENT)
Dept: ENDOCRINOLOGY | Facility: CLINIC | Age: 48
End: 2023-10-02
Payer: COMMERCIAL

## 2023-10-02 DIAGNOSIS — E10.9 TYPE 1 DIABETES MELLITUS WITHOUT COMPLICATION: Primary | ICD-10-CM

## 2023-10-02 RX ORDER — BLOOD-GLUCOSE SENSOR
EACH MISCELLANEOUS
Qty: 2 EACH | Refills: 6 | Status: SHIPPED | OUTPATIENT
Start: 2023-10-02

## 2023-10-02 RX ORDER — BLOOD-GLUCOSE SENSOR
EACH MISCELLANEOUS
Qty: 6 EACH | Refills: 3 | Status: SHIPPED | OUTPATIENT
Start: 2023-10-02

## 2023-11-29 DIAGNOSIS — G40.909 SEIZURE DISORDER: ICD-10-CM

## 2023-11-29 DIAGNOSIS — E10.9 TYPE 1 DIABETES MELLITUS WITHOUT COMPLICATION: ICD-10-CM

## 2023-11-29 RX ORDER — LEVETIRACETAM 750 MG/1
750 TABLET ORAL 2 TIMES DAILY
Qty: 180 TABLET | Refills: 2 | Status: SHIPPED | OUTPATIENT
Start: 2023-11-29 | End: 2024-11-28

## 2023-11-29 RX ORDER — LISINOPRIL AND HYDROCHLOROTHIAZIDE 12.5; 2 MG/1; MG/1
1 TABLET ORAL DAILY
Qty: 90 TABLET | Refills: 1 | Status: SHIPPED | OUTPATIENT
Start: 2023-11-29 | End: 2024-02-14

## 2023-11-29 RX ORDER — BLOOD-GLUCOSE SENSOR
EACH MISCELLANEOUS
Qty: 6 EACH | Refills: 1 | Status: SHIPPED | OUTPATIENT
Start: 2023-11-29

## 2023-12-04 ENCOUNTER — PATIENT MESSAGE (OUTPATIENT)
Dept: ENDOCRINOLOGY | Facility: CLINIC | Age: 48
End: 2023-12-04
Payer: COMMERCIAL

## 2023-12-04 DIAGNOSIS — I10 ESSENTIAL HYPERTENSION: Primary | ICD-10-CM

## 2024-01-04 ENCOUNTER — PATIENT MESSAGE (OUTPATIENT)
Dept: OPTOMETRY | Facility: CLINIC | Age: 49
End: 2024-01-04
Payer: COMMERCIAL

## 2024-01-05 ENCOUNTER — OFFICE VISIT (OUTPATIENT)
Dept: OPTOMETRY | Facility: CLINIC | Age: 49
End: 2024-01-05
Payer: COMMERCIAL

## 2024-01-05 DIAGNOSIS — E10.9 TYPE 1 DIABETES MELLITUS WITHOUT COMPLICATION: Primary | ICD-10-CM

## 2024-01-05 DIAGNOSIS — Z46.0 FITTING AND ADJUSTMENT OF SPECTACLES AND CONTACT LENSES: Primary | ICD-10-CM

## 2024-01-05 DIAGNOSIS — H52.13 MYOPIA OF BOTH EYES: ICD-10-CM

## 2024-01-05 PROCEDURE — 92310 CONTACT LENS FITTING OU: CPT | Mod: CSM,S$GLB,, | Performed by: OPTOMETRIST

## 2024-01-05 PROCEDURE — 1160F RVW MEDS BY RX/DR IN RCRD: CPT | Mod: CPTII,,, | Performed by: OPTOMETRIST

## 2024-01-05 PROCEDURE — 2023F DILAT RTA XM W/O RTNOPTHY: CPT | Mod: CPTII,S$GLB,, | Performed by: OPTOMETRIST

## 2024-01-05 PROCEDURE — 1159F MED LIST DOCD IN RCRD: CPT | Mod: CPTII,,, | Performed by: OPTOMETRIST

## 2024-01-05 PROCEDURE — 99999 PR PBB SHADOW E&M-EST. PATIENT-LVL III: CPT | Mod: PBBFAC,,, | Performed by: OPTOMETRIST

## 2024-01-05 PROCEDURE — 92014 COMPRE OPH EXAM EST PT 1/>: CPT | Mod: S$GLB,,, | Performed by: OPTOMETRIST

## 2024-01-05 NOTE — PROGRESS NOTES
HPI    Pt here for routine DM exam and contact fitting JO 11/21/22      Pt complains of bump on OS eyelid, pt denies pain or discomfort. Pt denies   any changes in vision. Pt not using eye drops. Pt denies floaters and   flashes.   DM controlled w aid and diet. Pt denies any discomfort w contacts, needs   updated spec and contact Rx.     Hemoglobin A1C       Date                     Value               Ref Range             Status                02/20/2023               5.4                 4.0 - 5.6 %           Final                     07/19/2022               6.4 (H)             4.0 - 5.6 %           Final                     12/20/2021               5.9 (H)             4.0 - 5.6 %           Final                   Last edited by Estefani Rain on 1/5/2024  9:05 AM.            Assessment /Plan     For exam results, see Encounter Report.    Type 1 diabetes mellitus without complication    Myopia of both eyes      No diabetic retinopathy, no csme. Return in 1 year for dilated eye exam.  2. Contact lens Rx released to pt. Daily wear only advised, with education to risks of extended wear.  Discussed lens care, compliance and solutions. RTC yearly contact lens follow up. , discussed different options for glasses. RTC 1 year routine eye exam.

## 2024-01-09 ENCOUNTER — PATIENT MESSAGE (OUTPATIENT)
Dept: OPTOMETRY | Facility: CLINIC | Age: 49
End: 2024-01-09
Payer: COMMERCIAL

## 2024-01-22 RX ORDER — FLASH GLUCOSE SENSOR
KIT MISCELLANEOUS
Qty: 6 KIT | Refills: 3 | Status: SHIPPED | OUTPATIENT
Start: 2024-01-22 | End: 2024-05-28

## 2024-02-12 DIAGNOSIS — E10.9 TYPE 1 DIABETES MELLITUS WITHOUT COMPLICATION: ICD-10-CM

## 2024-02-14 RX ORDER — LISINOPRIL AND HYDROCHLOROTHIAZIDE 12.5; 2 MG/1; MG/1
1 TABLET ORAL
Qty: 30 TABLET | Refills: 0 | Status: SHIPPED | OUTPATIENT
Start: 2024-02-14 | End: 2024-05-08

## 2024-02-28 DIAGNOSIS — E10.9 TYPE 1 DIABETES MELLITUS WITHOUT COMPLICATION: ICD-10-CM

## 2024-02-29 RX ORDER — INSULIN PUMP SYRINGE, 3 ML
EACH MISCELLANEOUS
Qty: 1 EACH | Refills: 0 | Status: SHIPPED | OUTPATIENT
Start: 2024-02-29 | End: 2025-02-26

## 2024-03-07 DIAGNOSIS — E10.9 TYPE 1 DIABETES MELLITUS WITHOUT COMPLICATION: ICD-10-CM

## 2024-03-07 RX ORDER — INSULIN LISPRO 100 [IU]/ML
INJECTION, SOLUTION INTRAVENOUS; SUBCUTANEOUS
Qty: 45 EACH | Refills: 3 | Status: SHIPPED | OUTPATIENT
Start: 2024-03-07 | End: 2024-04-17

## 2024-03-07 RX ORDER — INSULIN DEGLUDEC 100 U/ML
INJECTION, SOLUTION SUBCUTANEOUS
Qty: 3 ML | Refills: 4 | Status: SHIPPED | OUTPATIENT
Start: 2024-03-07 | End: 2024-04-17

## 2024-04-17 DIAGNOSIS — E10.9 TYPE 1 DIABETES MELLITUS WITHOUT COMPLICATION: ICD-10-CM

## 2024-04-17 RX ORDER — INSULIN LISPRO 100 [IU]/ML
INJECTION, SOLUTION INTRAVENOUS; SUBCUTANEOUS
Qty: 30 ML | Refills: 3 | Status: SHIPPED | OUTPATIENT
Start: 2024-04-17 | End: 2024-05-28 | Stop reason: SDUPTHER

## 2024-04-17 RX ORDER — BLOOD-GLUCOSE SENSOR
EACH MISCELLANEOUS
Qty: 6 EACH | Refills: 3 | Status: SHIPPED | OUTPATIENT
Start: 2024-04-17 | End: 2024-05-28 | Stop reason: SDUPTHER

## 2024-04-17 RX ORDER — INSULIN DEGLUDEC 100 U/ML
INJECTION, SOLUTION SUBCUTANEOUS
Qty: 30 ML | Refills: 3 | Status: SHIPPED | OUTPATIENT
Start: 2024-04-17 | End: 2024-05-28 | Stop reason: SDUPTHER

## 2024-05-05 DIAGNOSIS — E10.9 TYPE 1 DIABETES MELLITUS WITHOUT COMPLICATION: ICD-10-CM

## 2024-05-08 RX ORDER — LISINOPRIL AND HYDROCHLOROTHIAZIDE 12.5; 2 MG/1; MG/1
1 TABLET ORAL
Qty: 90 TABLET | Refills: 3 | Status: SHIPPED | OUTPATIENT
Start: 2024-05-08 | End: 2024-05-28 | Stop reason: SDUPTHER

## 2024-05-17 ENCOUNTER — PATIENT MESSAGE (OUTPATIENT)
Dept: ENDOCRINOLOGY | Facility: CLINIC | Age: 49
End: 2024-05-17
Payer: COMMERCIAL

## 2024-05-25 ENCOUNTER — LAB VISIT (OUTPATIENT)
Dept: LAB | Facility: HOSPITAL | Age: 49
End: 2024-05-25
Payer: COMMERCIAL

## 2024-05-25 DIAGNOSIS — E10.9 TYPE 1 DIABETES MELLITUS WITHOUT COMPLICATION: ICD-10-CM

## 2024-05-25 LAB
ALBUMIN SERPL BCP-MCNC: 4.5 G/DL (ref 3.5–5.2)
ALP SERPL-CCNC: 54 U/L (ref 55–135)
ALT SERPL W/O P-5'-P-CCNC: 19 U/L (ref 10–44)
ANION GAP SERPL CALC-SCNC: 12 MMOL/L (ref 8–16)
AST SERPL-CCNC: 25 U/L (ref 10–40)
BILIRUB SERPL-MCNC: 0.5 MG/DL (ref 0.1–1)
BUN SERPL-MCNC: 19 MG/DL (ref 6–20)
CALCIUM SERPL-MCNC: 9.8 MG/DL (ref 8.7–10.5)
CHLORIDE SERPL-SCNC: 105 MMOL/L (ref 95–110)
CHOLEST SERPL-MCNC: 179 MG/DL (ref 120–199)
CHOLEST/HDLC SERPL: 2.8 {RATIO} (ref 2–5)
CO2 SERPL-SCNC: 22 MMOL/L (ref 23–29)
COMPLEXED PSA SERPL-MCNC: 0.41 NG/ML (ref 0–4)
CREAT SERPL-MCNC: 1.4 MG/DL (ref 0.5–1.4)
EST. GFR  (NO RACE VARIABLE): >60 ML/MIN/1.73 M^2
ESTIMATED AVG GLUCOSE: 134 MG/DL (ref 68–131)
GLUCOSE SERPL-MCNC: 275 MG/DL (ref 70–110)
HBA1C MFR BLD: 6.3 % (ref 4–5.6)
HDLC SERPL-MCNC: 64 MG/DL (ref 40–75)
HDLC SERPL: 35.8 % (ref 20–50)
LDLC SERPL CALC-MCNC: 94 MG/DL (ref 63–159)
NONHDLC SERPL-MCNC: 115 MG/DL
POTASSIUM SERPL-SCNC: 4.2 MMOL/L (ref 3.5–5.1)
PROT SERPL-MCNC: 7.6 G/DL (ref 6–8.4)
SODIUM SERPL-SCNC: 139 MMOL/L (ref 136–145)
TRIGL SERPL-MCNC: 105 MG/DL (ref 30–150)
TSH SERPL DL<=0.005 MIU/L-ACNC: 1.52 UIU/ML (ref 0.4–4)

## 2024-05-25 PROCEDURE — 83036 HEMOGLOBIN GLYCOSYLATED A1C: CPT | Performed by: NURSE PRACTITIONER

## 2024-05-25 PROCEDURE — 84153 ASSAY OF PSA TOTAL: CPT | Performed by: NURSE PRACTITIONER

## 2024-05-25 PROCEDURE — 80053 COMPREHEN METABOLIC PANEL: CPT | Performed by: NURSE PRACTITIONER

## 2024-05-25 PROCEDURE — 80061 LIPID PANEL: CPT | Performed by: NURSE PRACTITIONER

## 2024-05-25 PROCEDURE — 36415 COLL VENOUS BLD VENIPUNCTURE: CPT | Mod: PO | Performed by: NURSE PRACTITIONER

## 2024-05-25 PROCEDURE — 84443 ASSAY THYROID STIM HORMONE: CPT | Performed by: NURSE PRACTITIONER

## 2024-05-28 ENCOUNTER — OFFICE VISIT (OUTPATIENT)
Dept: ENDOCRINOLOGY | Facility: CLINIC | Age: 49
End: 2024-05-28
Payer: COMMERCIAL

## 2024-05-28 VITALS
OXYGEN SATURATION: 100 % | DIASTOLIC BLOOD PRESSURE: 64 MMHG | SYSTOLIC BLOOD PRESSURE: 114 MMHG | HEIGHT: 72 IN | HEART RATE: 91 BPM | BODY MASS INDEX: 28.98 KG/M2 | WEIGHT: 213.94 LBS

## 2024-05-28 DIAGNOSIS — G63 POLYNEUROPATHY ASSOCIATED WITH UNDERLYING DISEASE: ICD-10-CM

## 2024-05-28 DIAGNOSIS — E10.9 TYPE 1 DIABETES MELLITUS WITHOUT COMPLICATION: Primary | ICD-10-CM

## 2024-05-28 DIAGNOSIS — I10 ESSENTIAL HYPERTENSION: ICD-10-CM

## 2024-05-28 PROCEDURE — 99999 PR PBB SHADOW E&M-EST. PATIENT-LVL III: CPT | Mod: PBBFAC,,, | Performed by: NURSE PRACTITIONER

## 2024-05-28 PROCEDURE — 3061F NEG MICROALBUMINURIA REV: CPT | Mod: CPTII,S$GLB,, | Performed by: NURSE PRACTITIONER

## 2024-05-28 PROCEDURE — 3078F DIAST BP <80 MM HG: CPT | Mod: CPTII,S$GLB,, | Performed by: NURSE PRACTITIONER

## 2024-05-28 PROCEDURE — 1160F RVW MEDS BY RX/DR IN RCRD: CPT | Mod: CPTII,S$GLB,, | Performed by: NURSE PRACTITIONER

## 2024-05-28 PROCEDURE — 99214 OFFICE O/P EST MOD 30 MIN: CPT | Mod: 25,S$GLB,, | Performed by: NURSE PRACTITIONER

## 2024-05-28 PROCEDURE — 1159F MED LIST DOCD IN RCRD: CPT | Mod: CPTII,S$GLB,, | Performed by: NURSE PRACTITIONER

## 2024-05-28 PROCEDURE — 3074F SYST BP LT 130 MM HG: CPT | Mod: CPTII,S$GLB,, | Performed by: NURSE PRACTITIONER

## 2024-05-28 PROCEDURE — 3066F NEPHROPATHY DOC TX: CPT | Mod: CPTII,S$GLB,, | Performed by: NURSE PRACTITIONER

## 2024-05-28 PROCEDURE — 4010F ACE/ARB THERAPY RXD/TAKEN: CPT | Mod: CPTII,S$GLB,, | Performed by: NURSE PRACTITIONER

## 2024-05-28 PROCEDURE — 3008F BODY MASS INDEX DOCD: CPT | Mod: CPTII,S$GLB,, | Performed by: NURSE PRACTITIONER

## 2024-05-28 PROCEDURE — 3044F HG A1C LEVEL LT 7.0%: CPT | Mod: CPTII,S$GLB,, | Performed by: NURSE PRACTITIONER

## 2024-05-28 RX ORDER — BLOOD-GLUCOSE SENSOR
EACH MISCELLANEOUS
Qty: 2 EACH | Refills: 12 | Status: SHIPPED | OUTPATIENT
Start: 2024-05-28

## 2024-05-28 RX ORDER — LISINOPRIL AND HYDROCHLOROTHIAZIDE 12.5; 2 MG/1; MG/1
1 TABLET ORAL DAILY
Qty: 90 TABLET | Refills: 3 | Status: SHIPPED | OUTPATIENT
Start: 2024-05-28

## 2024-05-28 RX ORDER — INSULIN DEGLUDEC 100 U/ML
INJECTION, SOLUTION SUBCUTANEOUS
Qty: 30 ML | Refills: 3 | Status: SHIPPED | OUTPATIENT
Start: 2024-05-28

## 2024-05-28 RX ORDER — PEN NEEDLE, DIABETIC 30 GX3/16"
NEEDLE, DISPOSABLE MISCELLANEOUS
Qty: 150 EACH | Refills: 12 | Status: SHIPPED | OUTPATIENT
Start: 2024-05-28

## 2024-05-28 RX ORDER — INSULIN LISPRO 100 [IU]/ML
INJECTION, SOLUTION INTRAVENOUS; SUBCUTANEOUS
Qty: 30 ML | Refills: 3 | Status: SHIPPED | OUTPATIENT
Start: 2024-05-28

## 2024-05-28 RX ORDER — GLUCAGON 3 MG/1
POWDER NASAL
Qty: 2 EACH | Status: SHIPPED | OUTPATIENT
Start: 2024-05-28

## 2024-05-28 NOTE — PROGRESS NOTES
Subjective:      Patient ID: Rajesh Donovan is a 48 y.o. male.    Chief Complaint:  Routine DM f/u   .    History of Present Illness  Pt is a 48 y.o. WM  with a diagnosis of Type 1 diabetes mellitus diagnosed approximately  2013, as well as chronic conditions pending review including ED.  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 He did have a pump for about a year and hated it.     Interim Events: May 28, 2024:  Doing well. No focal complaints.  IS doing a lot of working out in prep for some [employer] type event in [place].  Sensor downloaded.  Note a lot of nocturnal hypoglycemia--but likely related to his 2 work out a day/days.        Current DM meds:   Tresiba 12,  Humalog 1:12 and ISF 1:50.        Failed DM meds:  none   Statin: refuses        Not tolerated statin : na  ACE/ARB:lisinopril 20 mg        Not tolerated ACE/ARB: na   Known Diabetic complications: neuropathy     CGMS Interpretation:       Sensor/Pump Interpretation or Prominent Theme:  Appears mild nocturnal hypoglycemia about every other day--which is corresponding to his 2 work out sessions on those days.      Feb 2, 2023:  No acute event. No focal complaints.  Back to [place] out a lot--  Using Nguyen sensor. . Denies any major hypoglycemia issues. If so usually occurs r/t heavy work out, so he carries sugar with him.  Basically no change since last visit. Getting  in April.     Exercising most days.    May run 5-10 miles.   5 miles on ellipitical.       Review of Systems   Constitutional:  Negative for activity change and fatigue.   HENT:  Negative for hearing loss and trouble swallowing.    Eyes:  Negative for photophobia and visual disturbance.        Last Eye Exam:  Appt pending    Respiratory:  Negative for cough and shortness of breath.    Cardiovascular:  Negative for chest pain and palpitations.   Gastrointestinal:  Negative for  constipation and diarrhea.   Genitourinary:  Negative for frequency and urgency.   Musculoskeletal:  Negative for arthralgias and myalgias.   Skin:  Negative for rash and wound.   Neurological:  Negative for weakness and numbness.   Psychiatric/Behavioral:  Negative for sleep disturbance. The patient is not nervous/anxious.        Objective:   Physical Exam  Constitutional:       Appearance: Normal appearance. He is normal weight.   HENT:      Head: Normocephalic and atraumatic.      Nose: Nose normal.      Mouth/Throat:      Mouth: Mucous membranes are moist.      Pharynx: Oropharynx is clear.   Eyes:      Extraocular Movements: Extraocular movements intact.      Conjunctiva/sclera: Conjunctivae normal.      Pupils: Pupils are equal, round, and reactive to light.   Neck:      Vascular: No carotid bruit.   Cardiovascular:      Rate and Rhythm: Normal rate and regular rhythm.      Pulses: Normal pulses.      Heart sounds: Normal heart sounds.   Pulmonary:      Effort: Pulmonary effort is normal.      Breath sounds: Normal breath sounds.   Musculoskeletal:         General: Normal range of motion.      Cervical back: Normal range of motion and neck supple.      Comments: Feet: no open wounds or calluses. Good pedal care.   Pedal pulses +2 bilaterally  Sensation via monofilament 5/5 biltaerally    Lymphadenopathy:      Cervical: No cervical adenopathy.   Skin:     General: Skin is warm and dry.   Neurological:      General: No focal deficit present.      Mental Status: He is alert and oriented to person, place, and time.   Psychiatric:         Mood and Affect: Mood normal.         Behavior: Behavior normal.         Thought Content: Thought content normal.         Judgment: Judgment normal.         Lab Review:   /64 (BP Location: Right arm, Patient Position: Sitting, BP Method: Medium (Manual))   Pulse 91   Ht 6' (1.829 m)   Wt 97 kg (213 lb 15.3 oz)   SpO2 100%   BMI 29.02 kg/m²     Hemoglobin A1C   Date  Value Ref Range Status   05/25/2024 6.3 (H) 4.0 - 5.6 % Final     Comment:     ADA Screening Guidelines:  5.7-6.4%  Consistent with prediabetes  >or=6.5%  Consistent with diabetes    High levels of fetal hemoglobin interfere with the HbA1C  assay. Heterozygous hemoglobin variants (HbS, HgC, etc)do  not significantly interfere with this assay.   However, presence of multiple variants may affect accuracy.     02/20/2023 5.4 4.0 - 5.6 % Final     Comment:     ADA Screening Guidelines:  5.7-6.4%  Consistent with prediabetes  >or=6.5%  Consistent with diabetes    High levels of fetal hemoglobin interfere with the HbA1C  assay. Heterozygous hemoglobin variants (HbS, HgC, etc)do  not significantly interfere with this assay.   However, presence of multiple variants may affect accuracy.     07/19/2022 6.4 (H) 4.0 - 5.6 % Final     Comment:     ADA Screening Guidelines:  5.7-6.4%  Consistent with prediabetes  >or=6.5%  Consistent with diabetes    High levels of fetal hemoglobin interfere with the HbA1C  assay. Heterozygous hemoglobin variants (HbS, HgC, etc)do  not significantly interfere with this assay.   However, presence of multiple variants may affect accuracy.         Chemistry        Component Value Date/Time     05/25/2024 0756    K 4.2 05/25/2024 0756     05/25/2024 0756    CO2 22 (L) 05/25/2024 0756    BUN 19 05/25/2024 0756    CREATININE 1.4 05/25/2024 0756     (H) 05/25/2024 0756        Component Value Date/Time    CALCIUM 9.8 05/25/2024 0756    ALKPHOS 54 (L) 05/25/2024 0756    AST 25 05/25/2024 0756    ALT 19 05/25/2024 0756    BILITOT 0.5 05/25/2024 0756    ESTGFRAFRICA >60.0 07/19/2022 0659    EGFRNONAA >60.0 07/19/2022 0659          Lab Results   Component Value Date    CHOL 179 05/25/2024     Lab Results   Component Value Date    HDL 64 05/25/2024     Lab Results   Component Value Date    LDLCALC 94.0 05/25/2024     Lab Results   Component Value Date    TRIG 105 05/25/2024     Lab Results  "  Component Value Date    CHOLHDL 35.8 05/25/2024     Lab Results   Component Value Date    MICALBCREAT 11.2 05/25/2024     Lab Results   Component Value Date    TSH 1.520 05/25/2024     No results found for: "[id number]"      Assessment:     1. Type 1 diabetes mellitus without complication  glucagon (BAQSIMI) 3 mg/actuation Spry    blood-glucose sensor (FREESTYLE TRISHA 3 SENSOR) Casie    insulin degludec (TRESIBA FLEXTOUCH U-100) 100 unit/mL (3 mL) insulin pen    insulin lispro (HUMALOG KWIKPEN INSULIN) 100 unit/mL pen    lisinopriL-hydrochlorothiazide (PRINZIDE,ZESTORETIC) 20-12.5 mg per tablet    pen needle, diabetic (BD ULTRA-FINE ÁNGEL PEN NEEDLE) 32 gauge x 5/32" Ndle      2. Essential hypertension        3. Polyneuropathy associated with underlying disease                Plan:   Pt declinces statin   On 20 lisinopril     Continue  [id number] units. ---decrease to 10 units on double work out days.     HUmalog  CHO 1:12, and ISF 1:50    On work out days, consider decreasing ISF from 1:50 to 1:75     ORDERS 05/28/2024     6  mo with A1c prior             "

## 2024-06-06 ENCOUNTER — PATIENT MESSAGE (OUTPATIENT)
Dept: ENDOCRINOLOGY | Facility: CLINIC | Age: 49
End: 2024-06-06
Payer: COMMERCIAL

## 2024-06-27 DIAGNOSIS — E10.9 TYPE 1 DIABETES MELLITUS WITHOUT COMPLICATION: ICD-10-CM

## 2024-06-27 RX ORDER — SILDENAFIL CITRATE 20 MG/1
TABLET ORAL
Qty: 30 TABLET | Refills: 11 | Status: SHIPPED | OUTPATIENT
Start: 2024-06-27

## 2024-07-02 DIAGNOSIS — E10.9 TYPE 1 DIABETES MELLITUS WITHOUT COMPLICATION: ICD-10-CM

## 2024-07-02 RX ORDER — BLOOD-GLUCOSE SENSOR
EACH MISCELLANEOUS
Qty: 6 EACH | Refills: 1 | Status: SHIPPED | OUTPATIENT
Start: 2024-07-02

## 2024-08-05 DIAGNOSIS — G40.909 SEIZURE DISORDER: ICD-10-CM

## 2024-08-05 RX ORDER — LEVETIRACETAM 750 MG/1
750 TABLET ORAL 2 TIMES DAILY
Qty: 180 TABLET | Refills: 3 | OUTPATIENT
Start: 2024-08-05

## 2024-08-09 ENCOUNTER — OFFICE VISIT (OUTPATIENT)
Dept: NEUROLOGY | Facility: CLINIC | Age: 49
End: 2024-08-09
Payer: COMMERCIAL

## 2024-08-09 DIAGNOSIS — G63 POLYNEUROPATHY ASSOCIATED WITH UNDERLYING DISEASE: ICD-10-CM

## 2024-08-09 DIAGNOSIS — G40.909 SEIZURE DISORDER: ICD-10-CM

## 2024-08-09 RX ORDER — GABAPENTIN 600 MG/1
TABLET ORAL
Qty: 270 TABLET | Refills: 3 | Status: SHIPPED | OUTPATIENT
Start: 2024-08-09

## 2024-08-09 RX ORDER — LEVETIRACETAM 750 MG/1
750 TABLET ORAL 2 TIMES DAILY
Qty: 180 TABLET | Refills: 3 | Status: SHIPPED | OUTPATIENT
Start: 2024-08-09 | End: 2025-08-09

## 2024-10-29 RX ORDER — LISINOPRIL 20 MG/1
TABLET ORAL
Qty: 90 TABLET | Refills: 3 | Status: SHIPPED | OUTPATIENT
Start: 2024-10-29

## 2024-10-29 RX ORDER — INSULIN LISPRO 100 [IU]/ML
INJECTION, SOLUTION INTRAVENOUS; SUBCUTANEOUS
Qty: 30 ML | Refills: 3 | Status: SHIPPED | OUTPATIENT
Start: 2024-10-29

## 2024-11-08 ENCOUNTER — PATIENT MESSAGE (OUTPATIENT)
Dept: ENDOCRINOLOGY | Facility: CLINIC | Age: 49
End: 2024-11-08
Payer: COMMERCIAL

## 2024-11-08 DIAGNOSIS — E10.9 TYPE 1 DIABETES MELLITUS WITHOUT COMPLICATION: ICD-10-CM

## 2024-11-08 RX ORDER — INSULIN LISPRO 100 [IU]/ML
INJECTION, SOLUTION INTRAVENOUS; SUBCUTANEOUS
Qty: 30 ML | Refills: 3 | Status: SHIPPED | OUTPATIENT
Start: 2024-11-08

## 2024-11-25 ENCOUNTER — LAB VISIT (OUTPATIENT)
Dept: LAB | Facility: HOSPITAL | Age: 49
End: 2024-11-25
Payer: COMMERCIAL

## 2024-11-25 DIAGNOSIS — E10.9 TYPE 1 DIABETES MELLITUS WITHOUT COMPLICATION: ICD-10-CM

## 2024-11-25 LAB
ESTIMATED AVG GLUCOSE: 134 MG/DL (ref 68–131)
HBA1C MFR BLD: 6.3 % (ref 4–5.6)

## 2024-11-25 PROCEDURE — 83036 HEMOGLOBIN GLYCOSYLATED A1C: CPT | Performed by: NURSE PRACTITIONER

## 2024-11-25 PROCEDURE — 36415 COLL VENOUS BLD VENIPUNCTURE: CPT | Mod: PO | Performed by: NURSE PRACTITIONER

## 2024-11-29 ENCOUNTER — OFFICE VISIT (OUTPATIENT)
Dept: ENDOCRINOLOGY | Facility: CLINIC | Age: 49
End: 2024-11-29
Payer: COMMERCIAL

## 2024-11-29 VITALS
OXYGEN SATURATION: 98 % | HEIGHT: 72 IN | SYSTOLIC BLOOD PRESSURE: 154 MMHG | HEART RATE: 57 BPM | WEIGHT: 218.06 LBS | DIASTOLIC BLOOD PRESSURE: 82 MMHG | BODY MASS INDEX: 29.54 KG/M2

## 2024-11-29 DIAGNOSIS — E10.9 TYPE 1 DIABETES MELLITUS WITHOUT COMPLICATION: Primary | ICD-10-CM

## 2024-11-29 DIAGNOSIS — G63 POLYNEUROPATHY ASSOCIATED WITH UNDERLYING DISEASE: ICD-10-CM

## 2024-11-29 DIAGNOSIS — I10 ESSENTIAL HYPERTENSION: ICD-10-CM

## 2024-11-29 PROCEDURE — 99999 PR PBB SHADOW E&M-EST. PATIENT-LVL III: CPT | Mod: PBBFAC,,, | Performed by: NURSE PRACTITIONER

## 2024-11-29 RX ORDER — LISINOPRIL 40 MG/1
40 TABLET ORAL DAILY
Qty: 90 TABLET | Refills: 3 | Status: SHIPPED | OUTPATIENT
Start: 2024-11-29 | End: 2025-11-29

## 2024-11-29 NOTE — PROGRESS NOTES
Subjective:      Patient ID: Rajesh Donovan is a 49 y.o. male.    Chief Complaint:  Routine DM f/u     History of Present Illness  Pt is a 49 y.o. WM  with a diagnosis of Type 1 diabetes mellitus diagnosed approximately  2013, as well as chronic conditions pending review including ED.  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 He did have a pump for about a year and hated it.     Interim Events: Nov 29, 2024:  No acute events or focal complaints. Wt up 5 lbs--BP running higher--he is still working 6 days a week with running, aerobics or wts.  Has been eating a lot of ham, james with holiday. No focal complaints.  Was having some runs of mild hypoglycemia for a few days so he decreased Tresiba from 12 to 10 units.      Current DM meds:   Tresiba 12,  Humalog 1:12 and ISF 1:50.        Failed DM meds:  none   Statin: refuses        Not tolerated statin : na  ACE/ARB:lisinopril 20 mg        Not tolerated ACE/ARB: na   Known Diabetic complications: neuropathy     CGMS Interpretation:           Sensor/Pump Interpretation or Prominent Theme:  Glucoses overnight generally , for which he decreased Tresiba. Mild prandial glucose excursions.  Higher numbers last couple of days with Thanksgiving.        May 28, 2024:  Doing well. No focal complaints.  IS doing a lot of working out in prep for some [employer] type event in [place].  Sensor downloaded.  Note a lot of nocturnal hypoglycemia--but likely related to his 2 work out a day/days.      Feb 2, 2023:  No acute event. No focal complaints.  Back to [employer] out a lot--  Using Nguyen sensor. . Denies any major hypoglycemia issues. If so usually occurs r/t heavy work out, so he carries sugar with him.  Basically no change since last visit. Getting  in April.     Exercising most days.    May run 5-10 miles.   5 miles on ellipitical.       Review of Systems    Constitutional:  Negative for activity change and fatigue.   HENT:  Negative for hearing loss and trouble swallowing.    Eyes:  Negative for photophobia and visual disturbance.        Last Eye Exam:  Jan 2024:     Respiratory:  Negative for cough and shortness of breath.    Cardiovascular:  Negative for chest pain and palpitations.   Gastrointestinal:  Negative for constipation and diarrhea.   Genitourinary:  Negative for frequency and urgency.   Musculoskeletal:  Negative for arthralgias and myalgias.   Skin:  Negative for rash and wound.   Neurological:  Negative for weakness and numbness.   Psychiatric/Behavioral:  Negative for sleep disturbance. The patient is not nervous/anxious.        Objective:   Physical Exam  Constitutional:       Appearance: Normal appearance. He is normal weight.   HENT:      Head: Normocephalic and atraumatic.      Nose: Nose normal.      Mouth/Throat:      Mouth: Mucous membranes are moist.      Pharynx: Oropharynx is clear.   Eyes:      Extraocular Movements: Extraocular movements intact.      Conjunctiva/sclera: Conjunctivae normal.      Pupils: Pupils are equal, round, and reactive to light.   Neck:      Vascular: No carotid bruit.   Cardiovascular:      Rate and Rhythm: Normal rate and regular rhythm.      Pulses: Normal pulses.      Heart sounds: Normal heart sounds.   Pulmonary:      Effort: Pulmonary effort is normal.      Breath sounds: Normal breath sounds.   Musculoskeletal:         General: Normal range of motion.      Cervical back: Normal range of motion and neck supple.      Right lower leg: Edema (trace bilaterally) present.      Left lower leg: Edema present.      Comments:  Feet: no open wounds or calluses. Good pedal care. Couple of toes with subungual herniations from recent running event.   Pedal pulses +2 bilaterally  Sensation via monofilament 5/5 biltaerally    Lymphadenopathy:      Cervical: No cervical adenopathy.   Skin:     General: Skin is warm and dry.    Neurological:      General: No focal deficit present.      Mental Status: He is alert and oriented to person, place, and time.   Psychiatric:         Mood and Affect: Mood normal.         Behavior: Behavior normal.         Thought Content: Thought content normal.         Judgment: Judgment normal.       Lab Review:   BP (!) 154/82 (Patient Position: Sitting)   Pulse (!) 57   Ht 6' (1.829 m)   Wt 98.9 kg (218 lb 0.6 oz)   SpO2 98%   BMI 29.57 kg/m²     Hemoglobin A1C   Date Value Ref Range Status   11/25/2024 6.3 (H) 4.0 - 5.6 % Final     Comment:     ADA Screening Guidelines:  5.7-6.4%  Consistent with prediabetes  >or=6.5%  Consistent with diabetes    High levels of fetal hemoglobin interfere with the HbA1C  assay. Heterozygous hemoglobin variants (HbS, HgC, etc)do  not significantly interfere with this assay.   However, presence of multiple variants may affect accuracy.     05/25/2024 6.3 (H) 4.0 - 5.6 % Final     Comment:     ADA Screening Guidelines:  5.7-6.4%  Consistent with prediabetes  >or=6.5%  Consistent with diabetes    High levels of fetal hemoglobin interfere with the HbA1C  assay. Heterozygous hemoglobin variants (HbS, HgC, etc)do  not significantly interfere with this assay.   However, presence of multiple variants may affect accuracy.     02/20/2023 5.4 4.0 - 5.6 % Final     Comment:     ADA Screening Guidelines:  5.7-6.4%  Consistent with prediabetes  >or=6.5%  Consistent with diabetes    High levels of fetal hemoglobin interfere with the HbA1C  assay. Heterozygous hemoglobin variants (HbS, HgC, etc)do  not significantly interfere with this assay.   However, presence of multiple variants may affect accuracy.         Chemistry        Component Value Date/Time     05/25/2024 0756    K 4.2 05/25/2024 0756     05/25/2024 0756    CO2 22 (L) 05/25/2024 0756    BUN 19 05/25/2024 0756    CREATININE 1.4 05/25/2024 0756     (H) 05/25/2024 0756        Component Value Date/Time     "CALCIUM 9.8 05/25/2024 0756    ALKPHOS 54 (L) 05/25/2024 0756    AST 25 05/25/2024 0756    ALT 19 05/25/2024 0756    BILITOT 0.5 05/25/2024 0756    ESTGFRAFRICA >60.0 07/19/2022 0659    EGFRNONAA >60.0 07/19/2022 0659          Lab Results   Component Value Date    CHOL 179 05/25/2024     Lab Results   Component Value Date    HDL 64 05/25/2024     Lab Results   Component Value Date    LDLCALC 94.0 05/25/2024     Lab Results   Component Value Date    TRIG 105 05/25/2024     Lab Results   Component Value Date    CHOLHDL 35.8 05/25/2024     Lab Results   Component Value Date    MICALBCREAT 11.2 05/25/2024     Lab Results   Component Value Date    TSH 1.520 05/25/2024     No results found for: "[id number]"      Assessment:     1. Type 1 diabetes mellitus without complication  Comprehensive Metabolic Panel    Hemoglobin A1C    Lipid Panel    Microalbumin/Creatinine Ratio, Urine      2. Essential hypertension  lisinopriL (PRINIVIL,ZESTRIL) 40 MG tablet      3. Polyneuropathy associated with underlying disease              Plan:   Pt declinces statin   On 20 lisinopril --increase to 40 mg     Continue  Tresiba 10 units. ---    HUmalog  CHO 1:12, and ISF 1:50    On work out days, consider decreasing ISF from 1:50 to 1:75     ORDERS 11/29/2024     6  mo with fasting cmp, lipids, A1c , urine m/c prior               "

## 2024-12-04 ENCOUNTER — PATIENT MESSAGE (OUTPATIENT)
Dept: ENDOCRINOLOGY | Facility: CLINIC | Age: 49
End: 2024-12-04
Payer: COMMERCIAL

## 2024-12-06 DIAGNOSIS — I10 ESSENTIAL HYPERTENSION: Primary | ICD-10-CM

## 2024-12-06 RX ORDER — IRBESARTAN 150 MG/1
150 TABLET ORAL NIGHTLY
Qty: 30 TABLET | Refills: 3 | Status: SHIPPED | OUTPATIENT
Start: 2024-12-06 | End: 2025-12-06

## 2024-12-11 ENCOUNTER — PATIENT MESSAGE (OUTPATIENT)
Dept: ENDOCRINOLOGY | Facility: CLINIC | Age: 49
End: 2024-12-11
Payer: COMMERCIAL

## 2024-12-11 DIAGNOSIS — I10 ESSENTIAL HYPERTENSION: Primary | ICD-10-CM

## 2024-12-11 RX ORDER — HYDROCHLOROTHIAZIDE 25 MG/1
25 TABLET ORAL DAILY
Qty: 30 TABLET | Refills: 3 | Status: SHIPPED | OUTPATIENT
Start: 2024-12-11 | End: 2024-12-13 | Stop reason: SDUPTHER

## 2024-12-13 DIAGNOSIS — I10 ESSENTIAL HYPERTENSION: ICD-10-CM

## 2024-12-13 RX ORDER — HYDROCHLOROTHIAZIDE 25 MG/1
25 TABLET ORAL DAILY
Qty: 90 TABLET | Refills: 3 | Status: SHIPPED | OUTPATIENT
Start: 2024-12-13 | End: 2025-12-13

## 2024-12-13 RX ORDER — HYDROCHLOROTHIAZIDE 25 MG/1
TABLET ORAL
Refills: 0 | OUTPATIENT
Start: 2024-12-13

## 2024-12-13 RX ORDER — IRBESARTAN 150 MG/1
TABLET ORAL
Refills: 0 | OUTPATIENT
Start: 2024-12-13

## 2024-12-13 RX ORDER — IRBESARTAN 150 MG/1
150 TABLET ORAL NIGHTLY
Qty: 90 TABLET | Refills: 3 | Status: SHIPPED | OUTPATIENT
Start: 2024-12-13 | End: 2025-12-13

## 2024-12-20 ENCOUNTER — PATIENT MESSAGE (OUTPATIENT)
Dept: ENDOCRINOLOGY | Facility: CLINIC | Age: 49
End: 2024-12-20
Payer: COMMERCIAL

## 2025-01-06 DIAGNOSIS — E10.9 TYPE 1 DIABETES MELLITUS WITHOUT COMPLICATION: ICD-10-CM

## 2025-01-06 RX ORDER — INSULIN PUMP SYRINGE, 3 ML
EACH MISCELLANEOUS
Qty: 1 EACH | Refills: 3 | Status: SHIPPED | OUTPATIENT
Start: 2025-01-06

## 2025-02-04 DIAGNOSIS — I10 ESSENTIAL HYPERTENSION: ICD-10-CM

## 2025-02-04 DIAGNOSIS — I10 ESSENTIAL HYPERTENSION: Primary | ICD-10-CM

## 2025-02-04 RX ORDER — IRBESARTAN 300 MG/1
300 TABLET ORAL NIGHTLY
Qty: 90 TABLET | Refills: 3 | Status: SHIPPED | OUTPATIENT
Start: 2025-02-04 | End: 2026-02-04

## 2025-02-05 RX ORDER — IRBESARTAN 150 MG/1
300 TABLET ORAL NIGHTLY
Qty: 180 TABLET | Refills: 3 | OUTPATIENT
Start: 2025-02-05 | End: 2026-02-05

## 2025-04-20 NOTE — ADDENDUM NOTE
Addended by: CHRISTIANA BURDEN on: 7/23/2019 09:34 AM     Modules accepted: Orders     DATE OF SERVICE  25      CHIEF COMPLAINT  Cough    HISTORY OF PRESENTING ILLNESS    Patient is a 89-year-old gentleman with past medical history significant for Alzheimer's disease, CVA, and hypertension.  He presents to the ED due to cough and generalized weakness.  Patient has been having symptoms for the past 2 days.  The cough has been nonproductive. He denied any shortness of breath.  Patient unsure about fever and chills.  He endorsed inadequate oral intake this past 2 days. He also endorsed some dizziness.  Patient denies any chest pain, nausea, vomiting, abdominal pain, back pain, or any urinary symptoms.    ED visit  Initial vital signs 185/78, pulse of 97.  Subsequent blood pressure was 160/72, pulse of 101.  Temperature 102.5.  1 L LR bolus given to patient.  Patient tested positive for COVID in the ED.  Chest x-ray obtained.  CT chest concerning for right-sided pneumonia.  Azithromycin and ceftriaxone initiated in the ED. Patient admitted for further treatment.    Past Medical History:   Diagnosis Date    Acute kidney injury (CMD) 2013    Alzheimer's dementia  (CMD)     Anxiety 2013    Arthritis     Bilateral hands    AV block     BPH (benign prostatic hyperplasia)     Cataracts, bilateral     CVA (cerebral vascular accident)  (CMD)     2012    CVA (cerebral vascular accident)  (CMD)     2012    Former smoker     Quit     GERD (gastroesophageal reflux disease)     Hypertension     Influenza 2018    Meningioma  (CMD)     Patent foramen ovale with atrial septal aneurysm (CMD)     Pure hypercholesterolemia 2013    PVD (peripheral vascular disease) (CMD)     RBBB     Tachycardia     Transient cerebral ischemia            Family History   Problem Relation Age of Onset    Other Mother          of old age    Cancer Father          at 72 yrs.     Arrhythmia Sister         has PPM.    Cancer Brother            ALLERGIES:   Allergen Reactions    Metoprolol SWELLING      Tongue 2019    Namenda [Memantine Hcl] Nausea & Vomiting    Warfarin Other (See Comments)     Bleeding x 3 weeks.  Patient does not wish to receive again.           Social History     Socioeconomic History    Marital status:      Spouse name: Not on file    Number of children: Not on file    Years of education: Not on file    Highest education level: Not on file   Occupational History    Not on file   Tobacco Use    Smoking status: Former     Current packs/day: 0.00     Types: Cigarettes     Quit date: 1995     Years since quittin.8    Smokeless tobacco: Former     Quit date: 1996   Substance and Sexual Activity    Alcohol use: No     Alcohol/week: 0.0 standard drinks of alcohol    Drug use: No    Sexual activity: Not on file   Other Topics Concern     Service Not Asked    Blood Transfusions Not Asked    Caffeine Concern Not Asked    Occupational Exposure Not Asked    Hobby Hazards Not Asked    Sleep Concern Not Asked    Stress Concern Not Asked    Weight Concern Not Asked    Special Diet Not Asked    Back Care Not Asked    Exercise Not Asked    Bike Helmet Not Asked    Seat Belt Yes    Self-Exams Not Asked   Social History Narrative    Lives with Grandson (Lindsay)    Christine (Daughter), Art (Son in law)        3 cats.     Social Drivers of Health     Financial Resource Strain: Low Risk  (2024)    Financial Resource Strain     Unable to Get: None   Food Insecurity: Low Risk  (2024)    Food Insecurity     Worried about Food: Never true     Food is Gone: Never true   Transportation Needs: Not At Risk (2024)    Transportation Needs     Lack of Reliable Transportation: No   Physical Activity: High Risk (2024)    Exercise Vital Sign     Days of Exercise per Week: 0 days     Minutes of Exercise per Session: 0 min   Stress: Low Risk  (2024)    Stress     How Stressed: A little bit   Social Connections: Low Risk  (2024)    Social Connections     Social  Connectivity: 5 or more times a week   Feeling safe in your relationship: Not on file (5/6/2023)         HOME MEDICATIONS    I personally reviewed patient's home medications. I reviewed for medication interactions, medication side effects and patient's essential medications.       REVIEW OF SYSTEMS:  GENERAL:  Patient denies chills, malaise, but complains of fever and fatigue.  EYES:  Patient denies blurred vision, double vision, pain, burning and itching.   IMMUNOLOGIC:  Patient denies hayfever, drug allergies.  NEUROLOGIC:  Patient denies tremors, numbness, tingling, vision change, loss of balance, but complains of dizzy spells.  ENDOCRINE:  Patient denies excessive thirst, heat intolerance, lymphnode enlargement.  GASTROINTESTINAL:  Patient denies abdominal pain, nausea/vomiting, indigestion/heartburn, diarrhea, constipation.  CARDIOVASCULAR:  Patient denies chest pain, varicose veins, high blood pressure.  SKIN:  Patient denies skin rashes, boils, persistent itching, acne.  MUSCULOSKELETAL:  Patient denies joint pain, neck pain, back pain, leg swelling.  ENT/MOUTH:  Patient denies ear infections, sore throats, sinus problems, hearing loss.  :  Patient denies urine retention, painful urination, urinary frequency, blood in urine, nocturia.  RESPIRATORY:  Patient denies wheezing,  shortness of breath, but complains of occasional cough,.      PHYSICAL EXAM AND VITALS  Blood pressure (!) 175/71, pulse 94, temperature (!) 102.5 °F (39.2 °C), resp. rate 18, height 4' 10\" (1.473 m), weight 65.1 kg (143 lb 8 oz), SpO2 94%.  GENERAL:  Nontoxic, nondistressed.   SKIN:  Warm and moist, without rashes or lesions.  HEENT:  Atraumatic.  Pupils are equal and reactive to light.  No conjunctival injection or scleral icterus.    NECK:  Supple, thyroid is not enlarged, no JVD.  CVS:  Normal S1 and S2.  No murmurs, rubs, or gallops.  ABD:  Nontender, nondistended, normal bowel sounds, no hepatosplenomegaly.  LUNGS:  No wheezing or  crackles, breathing is nonlabored.  EXT:  Full ROM of all four extremities, without joint warmth or joint tenderness.  No lower extremity edema.  NEURO:  Sensation is grossly intact.  Strength is 5/5 in all four extremities.  Reflexes are 2+ in all four extremities.  CN 2-12 intact.  VASCULAR:  Normal carotid, radial, and dorsal pulses.  Normal capillary refill.  LYMPHATIC:  No cervical or supraclavicular LN are palpable and they are nontender.  PSY:  Alert and oriented x3.  Normal affect.  Cooperative.      LABS   I personally reviewed patients labs.   Pertinent labs reviewed were: UA negative for bacteriuria    IMAGING  I personally reviewed patients imaging. Here are my personal interpretations.   CT CHEST WO CONTRAST  Result Date: 4/19/2025  Narrative: EXAMINATION: CT CHEST WO CONTRAST COMPARISON: Concurrent radiograph and CT chest 8/12/2014 HISTORY: eval for pna TECHNIQUE: Unenhanced axial images of the chest were obtained, and coronal and sagittal MPR and axial MIP reformatted images were also submitted. FINDINGS: There is no pleural or pericardial effusion. No enlarged lymph nodes are identified. The proximal ascending aorta measures approximately 4 cm in diameter unchanged since 2014. There is a PFO closure device and coronary artery calcifications are present. There is moderate asymmetric elevation of the right hemidiaphragm, which is longstanding. In addition to basilar atelectasis there is patchy airspace disease in the posterior right upper lobe, and subtle hazy and reticular opacities in the middle lobe and lower lobe are also present. Calcified pleural plaques are again seen. There is a calcified granuloma in the left apex. The left lung is otherwise clear There is a small hiatal hernia There are no destructive osseous lesions.     Impression: IMPRESSION: 1. Right-sided pneumonia most pronounced in the upper lobe 2. Chronic elevation of right hemidiaphragm 3. Calcified pleural plaques suggestive of  prior asbestos exposure Electronically Signed by: Yessy Schmidt MD Signed on: 4/19/2025 8:22 PM Created on Workstation ID: [id number] Signed on Workstation ID: [id number]    CT HEAD WO CONTRAST  Result Date: 4/19/2025  Narrative: EXAMINATION: CT HEAD WO CONTRAST COMPARISON: 7/29/2019 CLINICAL INDICATIONS: AMS TECHNIQUE: Unenhanced axial images of the head were obtained, and coronal and sagittal MPR reformats were also submitted.  FINDINGS: Postoperative changes are again seen on the left with craniectomy defect and cranioplasty There is no intracranial hemorrhage, mass, mass effect, extra-axial fluid or hydrocephalus. There is moderate to severe atrophy and chronic ischemic change. The gray white distinction is preserved. There are postoperative changes in the globes. There is no significant sinus opacification.     Impression: IMPRESSION: Atrophy and chronic ischemic change with no acute process Electronically Signed by: Yessy Schmidt MD Signed on: 4/19/2025 8:01 PM Created on Workstation ID: [id number] Signed on Workstation ID: [id number]    XR CHEST PA OR AP 1 VIEW  Result Date: 4/19/2025  Narrative: EXAMINATION: XR CHEST AP OR PA COMPARISON: 5/6/2023, 10/10/2022, CT 8/12/2014 CLINICAL INDICATIONS: Fever and weakness FINDINGS: The heart and pulmonary vessels are normal. There is a dense opacity projecting over the right hilum measuring approximately 2.5 cm Moderate asymmetric elevation of the right hemidiaphragm is longstanding. There is reticulonodular opacity in the mid and upper lung similar to multiple prior studies.. The left lung is clear. There is no pleural effusion or pneumothorax.     Impression: IMPRESSION: 1. Longstanding elevation of right hemidiaphragm with what is likely combination of atelectasis, scarring and calcified pleural plaques, which were seen on the 2014 CT 2. Dense opacity projecting over right hilum is likely artifactual or a calcified node. This limited by patient rotation and short-term  follow-up is recommended 3. No new abnormality Electronically Signed by: Yessy Schmidt MD Signed on: 4/19/2025 7:01 PM Created on Workstation ID: [id number] Signed on Workstation ID: [id number]        ASSESSMENT AND PLAN    Community-acquired pneumonia with superimposed COVID infection.  COVID could be the primary  as well.  Will cover patient for both COVID and bacterial pneumonia including atypical coverage.  No hypoxia  Gen weakness  Plan  Azithromycin  Ceftriaxone  Remdesivir  Follow-up blood cultures   Legionella urine antigen  Strep pneumo antigen.  Sputum culture  PT/OT    History of BPH  Continue patient's Flomax.    DVT PROX  Lovenox    Discussed with Dr. Stout.    I have explained that admission diagnosis and plan are based on available information and treatment plan will change based on clinical progress and as new information becomes available.    Questions answered to patient's satisfaction.      CODE STATUS  Selective DNR      Ludwin Morton MD  HOSPITALIST MEDICINE

## 2025-05-01 ENCOUNTER — PATIENT MESSAGE (OUTPATIENT)
Dept: ENDOCRINOLOGY | Facility: CLINIC | Age: 50
End: 2025-05-01
Payer: COMMERCIAL

## 2025-05-05 DIAGNOSIS — I10 ESSENTIAL HYPERTENSION: ICD-10-CM

## 2025-05-06 RX ORDER — IRBESARTAN 300 MG/1
TABLET ORAL
Qty: 90 TABLET | Refills: 3 | Status: SHIPPED | OUTPATIENT
Start: 2025-05-06

## 2025-05-19 ENCOUNTER — PATIENT MESSAGE (OUTPATIENT)
Dept: ENDOCRINOLOGY | Facility: CLINIC | Age: 50
End: 2025-05-19
Payer: COMMERCIAL

## 2025-05-27 ENCOUNTER — LAB VISIT (OUTPATIENT)
Dept: LAB | Facility: HOSPITAL | Age: 50
End: 2025-05-27
Attending: NURSE PRACTITIONER
Payer: COMMERCIAL

## 2025-05-27 DIAGNOSIS — E10.9 TYPE 1 DIABETES MELLITUS WITHOUT COMPLICATION: ICD-10-CM

## 2025-05-27 LAB
ALBUMIN/CREAT UR: NORMAL
CREAT UR-MCNC: 94 MG/DL (ref 23–375)
MICROALBUMIN UR-MCNC: <5 UG/ML (ref ?–5000)

## 2025-05-27 PROCEDURE — 82570 ASSAY OF URINE CREATININE: CPT

## 2025-05-28 ENCOUNTER — OFFICE VISIT (OUTPATIENT)
Dept: ENDOCRINOLOGY | Facility: CLINIC | Age: 50
End: 2025-05-28
Payer: COMMERCIAL

## 2025-05-28 ENCOUNTER — TELEPHONE (OUTPATIENT)
Dept: ENDOCRINOLOGY | Facility: CLINIC | Age: 50
End: 2025-05-28
Payer: COMMERCIAL

## 2025-05-28 VITALS
BODY MASS INDEX: 30.9 KG/M2 | SYSTOLIC BLOOD PRESSURE: 116 MMHG | DIASTOLIC BLOOD PRESSURE: 80 MMHG | WEIGHT: 215.81 LBS | HEART RATE: 68 BPM | HEIGHT: 70 IN

## 2025-05-28 DIAGNOSIS — I10 ESSENTIAL HYPERTENSION: ICD-10-CM

## 2025-05-28 DIAGNOSIS — E10.9 TYPE 1 DIABETES MELLITUS WITHOUT COMPLICATION: Primary | ICD-10-CM

## 2025-05-28 DIAGNOSIS — G63 POLYNEUROPATHY ASSOCIATED WITH UNDERLYING DISEASE: ICD-10-CM

## 2025-05-28 PROCEDURE — 99999 PR PBB SHADOW E&M-EST. PATIENT-LVL III: CPT | Mod: PBBFAC,,, | Performed by: NURSE PRACTITIONER

## 2025-05-28 RX ORDER — BLOOD-GLUCOSE SENSOR
1 EACH MISCELLANEOUS
Qty: 6 EACH | Refills: 3 | Status: SHIPPED | OUTPATIENT
Start: 2025-05-28 | End: 2026-05-28

## 2025-05-28 NOTE — PROGRESS NOTES
Subjective:      Patient ID: Rajesh Donovan is a 49 y.o. male.    Chief Complaint:  Routine DM f/u     History of Present Illness  Pt is a 49 y.o. WM  with a diagnosis of Type 1 diabetes mellitus diagnosed approximately  2013, as well as chronic conditions pending review including ED.  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 ([employer]) Very active in sports. He did have a pump for about a year and hated it.     Interim Events: May 28, 2025:  NO focal complaints or acute events. Off for the summer. [place]  Still going to gym, running, sports, etc. Sensor downloaded and reviewed. We did change some bp meds in interim goint from lisinopril 40 to irbesartan 300/12. 5mg with much improvement.  Sensor downloaded and reviewed. Some marginal /near mild hypoglycemia.       Nov 29, 2024:  No acute events or focal complaints. Wt up 5 lbs--BP running higher--he is still working 6 days a week with running, aerobics or wts.  Has been eating a lot of ham, james with holiday. No focal complaints.  Was having some runs of mild hypoglycemia for a few days so he decreased Tresiba from 12 to 10 units.      Current DM meds:   Tresiba 12,  Humalog 1:12 and ISF 1:50.        Failed DM meds:  none   Statin: refuses        Not tolerated statin : na  ACE/ARB:lisinopril 20 mg        Not tolerated ACE/ARB: na   Known Diabetic complications: neuropathy     CGMS Interpretation:           Sensor/Pump Interpretation or Prominent Theme:  Glucoses overnight generally around 100 for which he Some rather marked prandial glucose excursions but seem to come down to low or mid 100 range bye 2-3 hrs later.    May 28, 2024:  Doing well. No focal complaints.  IS doing a lot of working out in prep for some [employer] type event in [place].  Sensor downloaded.  Note a lot of nocturnal hypoglycemia--but likely related to his 2 work out a day/days.       Feb 2, 2023:  No acute event. No focal complaints.  Back to [place] a lot--  Using Nguyen sensor. . Denies any major hypoglycemia issues. If so usually occurs r/t heavy work out, so he carries sugar with him.  Basically no change since last visit. Getting  in April.     Exercising most days.    May run 5-10 miles.   5 miles on ellipitical.       Review of Systems   Constitutional:  Negative for activity change and fatigue.   HENT:  Negative for hearing loss and trouble swallowing.    Eyes:  Negative for photophobia and visual disturbance.        Last Eye Exam:  Jan 2024:     Respiratory:  Negative for cough and shortness of breath.    Cardiovascular:  Negative for chest pain and palpitations.   Gastrointestinal:  Negative for constipation and diarrhea.   Genitourinary:  Negative for frequency and urgency.   Musculoskeletal:  Negative for arthralgias and myalgias.   Skin:  Negative for rash and wound.   Neurological:  Negative for weakness and numbness.   Psychiatric/Behavioral:  Negative for sleep disturbance. The patient is not nervous/anxious.        Objective:   Physical Exam  Constitutional:       Appearance: Normal appearance. He is normal weight.   HENT:      Head: Normocephalic and atraumatic.      Nose: Nose normal.      Mouth/Throat:      Mouth: Mucous membranes are moist.      Pharynx: Oropharynx is clear.   Eyes:      Extraocular Movements: Extraocular movements intact.      Conjunctiva/sclera: Conjunctivae normal.      Pupils: Pupils are equal, round, and reactive to light.   Neck:      Vascular: No carotid bruit.   Cardiovascular:      Rate and Rhythm: Normal rate and regular rhythm.      Pulses: Normal pulses.      Heart sounds: Normal heart sounds.   Pulmonary:      Effort: Pulmonary effort is normal.      Breath sounds: Normal breath sounds.   Musculoskeletal:         General: Normal range of motion.      Cervical back: Normal range of motion and neck supple.      Right lower leg: Edema  "(trace bilaterally) present.      Left lower leg: Edema present.      Comments:  Feet: no open wounds or calluses. Good pedal carePedal pulses +2 bilaterally  Sensation via monofilament 5/5 biltaerally    Lymphadenopathy:      Cervical: No cervical adenopathy.   Skin:     General: Skin is warm and dry.   Neurological:      General: No focal deficit present.      Mental Status: He is alert and oriented to person, place, and time.   Psychiatric:         Mood and Affect: Mood normal.         Behavior: Behavior normal.         Thought Content: Thought content normal.         Judgment: Judgment normal.         Lab Review:   /80   Pulse 68   Ht 5' 10.08" (1.78 m)   Wt 97.9 kg (215 lb 13.3 oz)   BMI 30.90 kg/m²     Hemoglobin A1C   Date Value Ref Range Status   11/25/2024 6.3 (H) 4.0 - 5.6 % Final     Comment:     ADA Screening Guidelines:  5.7-6.4%  Consistent with prediabetes  >or=6.5%  Consistent with diabetes    High levels of fetal hemoglobin interfere with the HbA1C  assay. Heterozygous hemoglobin variants (HbS, HgC, etc)do  not significantly interfere with this assay.   However, presence of multiple variants may affect accuracy.     05/25/2024 6.3 (H) 4.0 - 5.6 % Final     Comment:     ADA Screening Guidelines:  5.7-6.4%  Consistent with prediabetes  >or=6.5%  Consistent with diabetes    High levels of fetal hemoglobin interfere with the HbA1C  assay. Heterozygous hemoglobin variants (HbS, HgC, etc)do  not significantly interfere with this assay.   However, presence of multiple variants may affect accuracy.     02/20/2023 5.4 4.0 - 5.6 % Final     Comment:     ADA Screening Guidelines:  5.7-6.4%  Consistent with prediabetes  >or=6.5%  Consistent with diabetes    High levels of fetal hemoglobin interfere with the HbA1C  assay. Heterozygous hemoglobin variants (HbS, HgC, etc)do  not significantly interfere with this assay.   However, presence of multiple variants may affect accuracy.       Hemoglobin A1c " "  Date Value Ref Range Status   05/27/2025 6.8 (H) 4.0 - 5.6 % Final     Comment:     ADA Screening Guidelines:  5.7-6.4%  Consistent with prediabetes  >=6.5%  Consistent with diabetes    High levels of fetal hemoglobin interfere with the HbA1C  assay. Heterozygous hemoglobin variants (HbS, HgC, etc)do  not significantly interfere with this assay.   However, presence of multiple variants may affect accuracy.       Chemistry        Component Value Date/Time     05/27/2025 0704     05/25/2024 0756    K 4.6 05/27/2025 0704    K 4.2 05/25/2024 0756     05/27/2025 0704     05/25/2024 0756    CO2 29 05/27/2025 0704    CO2 22 (L) 05/25/2024 0756    BUN 13 05/27/2025 0704    CREATININE 1.0 05/27/2025 0704     (H) 05/27/2025 0704     (H) 05/25/2024 0756        Component Value Date/Time    CALCIUM 9.4 05/27/2025 0704    CALCIUM 9.8 05/25/2024 0756    ALKPHOS 64 05/27/2025 0704    ALKPHOS 54 (L) 05/25/2024 0756    AST 21 05/27/2025 0704    AST 25 05/25/2024 0756    ALT 16 05/27/2025 0704    ALT 19 05/25/2024 0756    BILITOT 0.5 05/27/2025 0704    BILITOT 0.5 05/25/2024 0756    ESTGFRAFRICA >60.0 07/19/2022 0659    EGFRNONAA >60.0 07/19/2022 0659          Lab Results   Component Value Date    CHOL 172 05/27/2025     Lab Results   Component Value Date    HDL 57 05/27/2025     Lab Results   Component Value Date    LDLCALC 84.8 05/27/2025     Lab Results   Component Value Date    TRIG 151 (H) 05/27/2025     Lab Results   Component Value Date    CHOLHDL 33.1 05/27/2025     Lab Results   Component Value Date    MICALBCREAT  05/27/2025      Comment:      UNABLE TO CALCULATE     Lab Results   Component Value Date    TSH 1.520 05/25/2024     No results found for: "[id number]"      Assessment:     1. Type 1 diabetes mellitus without complication  blood-glucose sensor (FREESTYLE TRISHA 3 PLUS SENSOR) Casie      2. Essential hypertension        3. Polyneuropathy associated with underlying disease      "             Plan:   Pt declinces statin     Irbesartion 300/12.5 mg   Continue  Tresiba 10 units. ---    HUmalog  CHO 1:12, and ISF 1:50    On work out days, consider decreasing ISF from 1:50 to 1:75     ORDERS 05/28/2025     6  mo with f A1c only prior

## 2025-05-30 ENCOUNTER — TELEPHONE (OUTPATIENT)
Dept: ENDOCRINOLOGY | Facility: CLINIC | Age: 50
End: 2025-05-30
Payer: COMMERCIAL

## 2025-05-30 NOTE — TELEPHONE ENCOUNTER
S/w Carlos with [facility], clarified RX for Nguyen 3 Plus sensors should be for Q 15 days. She verbalized understanding.

## 2025-05-30 NOTE — TELEPHONE ENCOUNTER
Copied from CRM #[id number]. Topic: Medications - Medication Authorization  >> May 30, 2025 12:44 PM Kandi wrote:  Type:  Pharmacy Calling to Clarify an RX    Name of Caller:Carmencita  Pharmacy Name:[facility]  Prescription Name:blood-glucose sensor (FREESTYLE TRISHA 3 PLUS SENSOR) Casie  What do they need to clarify?:need actual drug name and the strength. Plus is 15 days reg is 14 days  Best Call Back Number:[phone]  Ref: [id number]  Additional Information:

## 2025-06-02 ENCOUNTER — OFFICE VISIT (OUTPATIENT)
Dept: OPTOMETRY | Facility: CLINIC | Age: 50
End: 2025-06-02
Payer: COMMERCIAL

## 2025-06-02 DIAGNOSIS — E10.9 TYPE 1 DIABETES MELLITUS WITHOUT COMPLICATION: Primary | ICD-10-CM

## 2025-06-02 DIAGNOSIS — H52.13 MYOPIA OF BOTH EYES: ICD-10-CM

## 2025-06-02 PROCEDURE — 3066F NEPHROPATHY DOC TX: CPT | Mod: CPTII,,, | Performed by: OPTOMETRIST

## 2025-06-02 PROCEDURE — 3044F HG A1C LEVEL LT 7.0%: CPT | Mod: CPTII,,, | Performed by: OPTOMETRIST

## 2025-06-02 PROCEDURE — 1159F MED LIST DOCD IN RCRD: CPT | Mod: CPTII,,, | Performed by: OPTOMETRIST

## 2025-06-02 PROCEDURE — 99999 PR PBB SHADOW E&M-EST. PATIENT-LVL III: CPT | Mod: PBBFAC,,, | Performed by: OPTOMETRIST

## 2025-06-02 PROCEDURE — 2023F DILAT RTA XM W/O RTNOPTHY: CPT | Mod: CPTII,,, | Performed by: OPTOMETRIST

## 2025-06-02 PROCEDURE — 1160F RVW MEDS BY RX/DR IN RCRD: CPT | Mod: CPTII,,, | Performed by: OPTOMETRIST

## 2025-06-02 PROCEDURE — 92014 COMPRE OPH EXAM EST PT 1/>: CPT | Mod: ,,, | Performed by: OPTOMETRIST

## 2025-06-02 PROCEDURE — 4010F ACE/ARB THERAPY RXD/TAKEN: CPT | Mod: CPTII,,, | Performed by: OPTOMETRIST

## 2025-06-02 PROCEDURE — 3061F NEG MICROALBUMINURIA REV: CPT | Mod: CPTII,,, | Performed by: OPTOMETRIST

## 2025-06-02 PROCEDURE — 92310 CONTACT LENS FITTING OU: CPT | Mod: CSM,,, | Performed by: OPTOMETRIST

## 2025-06-18 ENCOUNTER — PATIENT MESSAGE (OUTPATIENT)
Dept: OPTOMETRY | Facility: CLINIC | Age: 50
End: 2025-06-18
Payer: COMMERCIAL

## 2025-07-01 ENCOUNTER — OFFICE VISIT (OUTPATIENT)
Dept: NEUROLOGY | Facility: CLINIC | Age: 50
End: 2025-07-01
Payer: COMMERCIAL

## 2025-07-01 DIAGNOSIS — G40.909 SEIZURE DISORDER: Primary | ICD-10-CM

## 2025-07-01 DIAGNOSIS — G63 POLYNEUROPATHY ASSOCIATED WITH UNDERLYING DISEASE: ICD-10-CM

## 2025-07-01 RX ORDER — GABAPENTIN 600 MG/1
600 TABLET ORAL DAILY
Qty: 90 TABLET | Refills: 3 | Status: SHIPPED | OUTPATIENT
Start: 2025-07-01

## 2025-07-01 NOTE — PROGRESS NOTES
"The patient location is: [place]  The chief complaint leading to consultation is: Seizure follow up     Visit type: audiovisual    Face to Face time with patient: 7:39  15 minutes of total time spent on the encounter, which includes face to face time and non-face to face time preparing to see the patient (eg, review of tests), Obtaining and/or reviewing separately obtained history, Documenting clinical information in the electronic or other health record, Independently interpreting results (not separately reported) and communicating results to the patient/family/caregiver, or Care coordination (not separately reported).    Each patient to whom he or she provides medical services by telemedicine is:  (1) informed of the relationship between the physician and patient and the respective role of any other health care provider with respect to management of the patient; and (2) notified that he or she may decline to receive medical services by telemedicine and may withdraw from such care at any time.    Notes:           Date of Service:   7/1/2025    Chief Complaint:  Seizures    History of Present Illness:   Mr. Donovan is a 49 y.o. male with a PmHX of HTN, Type 1 Diabetes, neuropathy, and epilepsy who presents for follow up of epilepsy and neuropathy.     Interval History:  He has continued on keppra 750 mg BID and gabapentin 600 mg Daily. No seizures. No side effects from the medication. He is compliant with his medication.      His neuropathy is stable and gabapentin controls the pain, he has actually been able to reduce is dose and pain is well controlled.     He is driving.     Denies any falls or injuries.     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 Denies any changes in his mood.     Current AEDs:  Keppra 750 mg BID   Gabapentin 600 mg Daily (for neuropathy)    SIDE EFFECTS of AEDs:  None     Prior HPI per Dr Calero:  "Seizures started at age 22 and 23. He was told he " "had idiopathic seizures. He had convulsions. No aura or warning before they were going to happen. His dog would alert them that it was about to happen. He bit his tongue and had urinary incontinence. He had about 10 seizures in the 1.5 years before they stopped. He continued to have seizures until the keppra was upped to his current dose. His last seizure was when he was 24 years old. He doesn't recall having myoclonic jerks.      He had seizures diagnosed in [place] and had MRI and EEG and was told they were normal. He doesn't recall the hospital name.      He was 33/34 years old when he was diagnosed with type 1 diabetes. He presented with weight loss. He has neuropathy and takes gabapentin. His feet will burn, especially at night. The gabapentin helps some. He finds THC helps more so.   No side effects with the gabapentin.      No family history of seizures. He does have a strong family history of diabetes. No history of head trauma/never diagnosed with concussions."       Review of patient's allergies indicates:   Allergen Reactions    Demerol [meperidine] Swelling     Other reaction(s): severe swollen    Penicillins      Other reaction(s): don't remember  unknown       Current Outpatient Medications   Medication Sig Dispense Refill    blood sugar diagnostic (FREESTYLE LITE STRIPS) Strp CHECK BLOOD SUGAR 3 TIMES A DAY (Patient not taking: Reported on 12/18/2024) 100 strip 2    blood-glucose meter (FREESTYLE LITE METER) kit USE TO CHECK BLOOD GLUCOSE THREE TIMES A DAY 1 each 3    blood-glucose sensor (FREESTYLE TRISHA 3 PLUS SENSOR) Casie 1 Device by Misc.(Non-Drug; Combo Route) route every 14 (fourteen) days. 6 each 3    gabapentin (NEURONTIN) 600 MG tablet Take 1 tablet (600 mg total) by mouth once daily. 90 tablet 3    glucagon (BAQSIMI) 3 mg/actuation Spry Spray into nose in event of hypoglycemia and unable to treat orally 2 each PRN    hydroCHLOROthiazide (HYDRODIURIL) 25 MG tablet Take 1 tablet (25 mg total) by " "mouth once daily. 90 tablet 3    insulin degludec (TRESIBA FLEXTOUCH U-100) 100 unit/mL (3 mL) insulin pen INJECT 30 UNITS INTO THE SKIN EVERY EVENING. 30 mL 3    insulin lispro (HUMALOG KWIKPEN INSULIN) 100 unit/mL pen INJECT 5 TO 15 UNITS INTO THE SKIN 3 TIMES A DAY BEFORE MEALS WITH SLIDING SCALE. MAX: 50 UNITS. 30 mL 3    insulin lispro (HUMALOG U-100 INSULIN) 100 unit/mL Crtg INJECT 5 TO 15 UNITS INTO THE SKIN 3 TIMES A DAY BEFORE MEALS WITH SLIDING SCALE. MAX: 50 UNITS. 30 mL 3    irbesartan (AVAPRO) 300 MG tablet Take 1 tablet (300 mg total) by mouth every evening. 90 tablet 3    lancets (ONETOUCH DELICA LANCETS) 33 gauge Misc 1 lancet by Other route 4 (four) times daily. Pt to use to check glucoses  qid (Patient not taking: Reported on 12/18/2024) 150 each 12    levETIRAcetam (KEPPRA) 750 MG Tab Take 1 tablet (750 mg total) by mouth 2 (two) times daily. 180 tablet 3    ONETOUCH ULTRA TEST Strp TEST WITH 1 STRIP 5 TIMES DAILY (Patient not taking: Reported on 12/18/2024) 150 strip 11    pen needle, diabetic (BD ULTRA-FINE ÁNGEL PEN NEEDLE) 32 gauge x 5/32" Ndle Pt to check glucose qid 150 each 12    sildenafil (REVATIO) 20 mg Tab TAKE 3 TABLETS BY MOUTH DAILY AS NEEDED 30 tablet 11     No current facility-administered medications for this visit.       Past Medical History:   Diagnosis Date    Hypertension     Hypoglycemia associated with diabetes 10/17/2019    Seizures     Type I (juvenile type) diabetes mellitus without mention of complication, uncontrolled 2012       Past Surgical History:   Procedure Laterality Date    ANTERIOR CRUCIATE LIGAMENT REPAIR Right     APPENDECTOMY      COLONOSCOPY      COLONOSCOPY N/A 7/9/2021    Procedure: COLONOSCOPY;  Surgeon: Sergio Reyez MD;  Location: [facility];  Service: Endoscopy;  Laterality: N/A;    HERNIA REPAIR      VASECTOMY         Family History   Problem Relation Name Age of Onset    Diabetes Mother          type 2    Heart disease Father      Hypertension " Father      Blindness Neg Hx      Glaucoma Neg Hx      Macular degeneration Neg Hx      Retinal detachment Neg Hx      Strabismus Neg Hx         Social History     Socioeconomic History    Marital status:    Tobacco Use    Smoking status: Former     Current packs/day: 0.50     Average packs/day: 0.5 packs/day for 31.5 years (15.8 ttl pk-yrs)     Types: Cigarettes     Start date: 12/17/1993     Passive exposure: Past    Smokeless tobacco: Never   Substance and Sexual Activity    Alcohol use: Yes     Alcohol/week: 5.0 standard drinks of alcohol     Types: 5 Shots of liquor per week     Comment: 2-3 drinks per night, variable    Drug use: Yes     Frequency: 4.0 times per week     Types: Marijuana, Other-see comments     Comment: Patient taking THC    Sexual activity: Yes     Partners: Female     Birth control/protection: None   Social History Narrative    7 th      Social Drivers of Health     Financial Resource Strain: Low Risk  (7/1/2025)    Overall Financial Resource Strain (CARDIA)     Difficulty of Paying Living Expenses: Not hard at all   Food Insecurity: No Food Insecurity (7/1/2025)    Hunger Vital Sign     Worried About Running Out of Food in the Last Year: Never true     Ran Out of Food in the Last Year: Never true   Transportation Needs: No Transportation Needs (7/1/2025)    PRAPARE - Transportation     Lack of Transportation (Medical): No     Lack of Transportation (Non-Medical): No   Physical Activity: Sufficiently Active (7/1/2025)    Exercise Vital Sign     Days of Exercise per Week: 7 days     Minutes of Exercise per Session: 40 min   Stress: Stress Concern Present (7/1/2025)    [employer] [place] [employer] - Occupational Stress Questionnaire     Feeling of Stress : To some extent   Housing Stability: Low Risk  (7/1/2025)    Housing Stability Vital Sign     Unable to Pay for Housing in the Last Year: No     Number of Times Moved in the Last Year: 0     Homeless in  "the Last Year: No          Review of Systems:  Comprehensive ROS is negative except as mentioned in the HPI.     Physical Exam:  There were no vitals taken for this visit.  General: Well developed, well nourished.  No acute distress.  HEENT: Atraumatic, normocephalic.    Neurological Exam:  Mental status: Awake and alert  Speech language: No dysarthria or aphasia on conversation  Cranial nerves: Face symmetric  Motor: Moves all extremities well  Sensory: deferred   Coordination: deferred      Data:  I have personally reviewed the referring provider's notes, labs, & imaging made available to me today.     Labs:  CBC: No results found for: "WBC", "HGB", "HCT", "PLT", "MCV", "RDW"  BMP:   Lab Results   Component Value Date     05/27/2025    K 4.6 05/27/2025     05/27/2025    CO2 29 05/27/2025    BUN 13 05/27/2025    CREATININE 1.0 05/27/2025     (H) 05/27/2025    CALCIUM 9.4 05/27/2025     LFTS;   Lab Results   Component Value Date    PROT 7.2 05/27/2025    ALBUMIN 4.0 05/27/2025    BILITOT 0.5 05/27/2025    AST 21 05/27/2025    ALKPHOS 64 05/27/2025    ALT 16 05/27/2025     COAGS: No results found for: "INR", "PROTIME", "PTT"  FLP:   Lab Results   Component Value Date    CHOL 172 05/27/2025    HDL 57 05/27/2025    LDLCALC 84.8 05/27/2025    TRIG 151 (H) 05/27/2025    CHOLHDL 33.1 05/27/2025       Assessment and Plan:  Mr. Donovan is a 49 y.o. male seen today for follow up of seizures and neuropathy. He is well controlled on Keppra 750 mg BID. No seizures or side effects. Normal Kidney function. He has reduced his gabapentin dose to 600 mg in the AM and neuropathy is well controlled. He follows with endocrinology for DM management. He is driving. No driving restrictions.       Seizure disorder    Polyneuropathy associated with underlying disease  -     gabapentin (NEURONTIN) 600 MG tablet; Take 1 tablet (600 mg total) by mouth once daily.  Dispense: 90 tablet; Refill: 3      Problem List Items " Addressed This Visit       Polyneuropathy associated with underlying disease    Seizure disorder - Primary       Thank you very much for the opportunity to assist in this patient's care.    If you have any questions or concerns, please do not hesitate to contact me at any time.    Sincerely,       BRENT Piper  [facility]     Patient was counseled on seizure safety including driving laws, water safety, and operation of machinery. We reviewed the importance of adequate sleep, compliance with medication, and limiting medications that may lower the seizure threshold (alcohol, tramadol, Wellbutrin, etc). All questions were answered and they were comfortable with the plan.     Folllow up in 6 months for in person visit.

## 2025-07-06 ENCOUNTER — PATIENT MESSAGE (OUTPATIENT)
Dept: NEUROLOGY | Facility: CLINIC | Age: 50
End: 2025-07-06
Payer: COMMERCIAL

## 2025-07-06 DIAGNOSIS — G63 POLYNEUROPATHY ASSOCIATED WITH UNDERLYING DISEASE: ICD-10-CM

## 2025-07-07 RX ORDER — GABAPENTIN 600 MG/1
600 TABLET ORAL DAILY
Qty: 90 TABLET | Refills: 3 | Status: SHIPPED | OUTPATIENT
Start: 2025-07-07

## 2025-07-23 DIAGNOSIS — E10.9 TYPE 1 DIABETES MELLITUS WITHOUT COMPLICATION: ICD-10-CM

## 2025-07-23 RX ORDER — SILDENAFIL CITRATE 20 MG/1
TABLET ORAL
Qty: 30 TABLET | Refills: 11 | Status: SHIPPED | OUTPATIENT
Start: 2025-07-23